# Patient Record
Sex: MALE | Race: WHITE | Employment: OTHER | URBAN - METROPOLITAN AREA
[De-identification: names, ages, dates, MRNs, and addresses within clinical notes are randomized per-mention and may not be internally consistent; named-entity substitution may affect disease eponyms.]

---

## 2017-01-01 ENCOUNTER — HOSPITAL ENCOUNTER (EMERGENCY)
Age: 82
Discharge: HOME | End: 2017-03-07
Attending: EMERGENCY MEDICINE
Payer: MEDICARE

## 2017-01-01 ENCOUNTER — HOME CARE VISIT (OUTPATIENT)
Dept: SCHEDULING | Facility: HOME HEALTH | Age: 82
End: 2017-01-01
Payer: MEDICARE

## 2017-01-01 ENCOUNTER — HOME CARE VISIT (OUTPATIENT)
Dept: HOSPICE | Facility: HOSPICE | Age: 82
End: 2017-01-01
Payer: MEDICARE

## 2017-01-01 ENCOUNTER — HOME CARE VISIT (OUTPATIENT)
Dept: HOME HEALTH SERVICES | Facility: HOME HEALTH | Age: 82
End: 2017-01-01
Payer: MEDICARE

## 2017-01-01 ENCOUNTER — TELEPHONE (OUTPATIENT)
Dept: CARDIOLOGY CLINIC | Age: 82
End: 2017-01-01

## 2017-01-01 ENCOUNTER — OFFICE VISIT (OUTPATIENT)
Dept: CARDIOLOGY CLINIC | Age: 82
End: 2017-01-01

## 2017-01-01 ENCOUNTER — APPOINTMENT (OUTPATIENT)
Dept: NUCLEAR MEDICINE | Age: 82
End: 2017-01-01
Attending: EMERGENCY MEDICINE
Payer: MEDICARE

## 2017-01-01 ENCOUNTER — HOSPITAL ENCOUNTER (INPATIENT)
Age: 82
LOS: 2 days | Discharge: HOSPICE | DRG: 291 | End: 2017-03-16
Attending: EMERGENCY MEDICINE | Admitting: HOSPITALIST
Payer: MEDICARE

## 2017-01-01 ENCOUNTER — APPOINTMENT (OUTPATIENT)
Dept: GENERAL RADIOLOGY | Age: 82
DRG: 291 | End: 2017-01-01
Attending: EMERGENCY MEDICINE
Payer: MEDICARE

## 2017-01-01 ENCOUNTER — HOSPICE ADMISSION (OUTPATIENT)
Dept: HOSPICE | Facility: HOSPICE | Age: 82
End: 2017-01-01
Payer: MEDICARE

## 2017-01-01 ENCOUNTER — APPOINTMENT (OUTPATIENT)
Dept: GENERAL RADIOLOGY | Age: 82
End: 2017-01-01
Attending: EMERGENCY MEDICINE
Payer: MEDICARE

## 2017-01-01 ENCOUNTER — TELEPHONE (OUTPATIENT)
Dept: CARDIAC REHAB | Age: 82
End: 2017-01-01

## 2017-01-01 ENCOUNTER — HOSPITAL ENCOUNTER (OUTPATIENT)
Dept: LAB | Age: 82
Discharge: HOME | End: 2017-01-17

## 2017-01-01 ENCOUNTER — TELEPHONE (OUTPATIENT)
Dept: HOME HEALTH SERVICES | Facility: HOME HEALTH | Age: 82
End: 2017-01-01

## 2017-01-01 VITALS — SYSTOLIC BLOOD PRESSURE: 122 MMHG | DIASTOLIC BLOOD PRESSURE: 59 MMHG | HEART RATE: 70 BPM | OXYGEN SATURATION: 96 %

## 2017-01-01 VITALS — OXYGEN SATURATION: 97 % | DIASTOLIC BLOOD PRESSURE: 53 MMHG | SYSTOLIC BLOOD PRESSURE: 119 MMHG | HEART RATE: 78 BPM

## 2017-01-01 VITALS — DIASTOLIC BLOOD PRESSURE: 55 MMHG | HEART RATE: 72 BPM | SYSTOLIC BLOOD PRESSURE: 129 MMHG | OXYGEN SATURATION: 99 %

## 2017-01-01 VITALS
DIASTOLIC BLOOD PRESSURE: 52 MMHG | HEART RATE: 70 BPM | WEIGHT: 142 LBS | OXYGEN SATURATION: 97 % | BODY MASS INDEX: 22.29 KG/M2 | HEIGHT: 67 IN | SYSTOLIC BLOOD PRESSURE: 118 MMHG

## 2017-01-01 VITALS
SYSTOLIC BLOOD PRESSURE: 113 MMHG | OXYGEN SATURATION: 92 % | HEART RATE: 69 BPM | RESPIRATION RATE: 16 BRPM | TEMPERATURE: 98.6 F | DIASTOLIC BLOOD PRESSURE: 64 MMHG

## 2017-01-01 VITALS
OXYGEN SATURATION: 97 % | DIASTOLIC BLOOD PRESSURE: 51 MMHG | WEIGHT: 136.4 LBS | SYSTOLIC BLOOD PRESSURE: 115 MMHG | BODY MASS INDEX: 21.41 KG/M2 | TEMPERATURE: 97.4 F | RESPIRATION RATE: 18 BRPM | HEART RATE: 69 BPM | HEIGHT: 67 IN

## 2017-01-01 VITALS
DIASTOLIC BLOOD PRESSURE: 64 MMHG | BODY MASS INDEX: 20.28 KG/M2 | RESPIRATION RATE: 16 BRPM | TEMPERATURE: 95.6 F | OXYGEN SATURATION: 97 % | HEIGHT: 67 IN | WEIGHT: 129.19 LBS | SYSTOLIC BLOOD PRESSURE: 100 MMHG | HEART RATE: 70 BPM

## 2017-01-01 VITALS
TEMPERATURE: 96.6 F | RESPIRATION RATE: 20 BRPM | SYSTOLIC BLOOD PRESSURE: 100 MMHG | HEART RATE: 76 BPM | OXYGEN SATURATION: 88 % | DIASTOLIC BLOOD PRESSURE: 60 MMHG

## 2017-01-01 VITALS
DIASTOLIC BLOOD PRESSURE: 63 MMHG | RESPIRATION RATE: 18 BRPM | TEMPERATURE: 97 F | OXYGEN SATURATION: 83 % | SYSTOLIC BLOOD PRESSURE: 99 MMHG | HEART RATE: 63 BPM

## 2017-01-01 VITALS
OXYGEN SATURATION: 98 % | RESPIRATION RATE: 16 BRPM | SYSTOLIC BLOOD PRESSURE: 110 MMHG | HEART RATE: 78 BPM | TEMPERATURE: 97 F | DIASTOLIC BLOOD PRESSURE: 80 MMHG

## 2017-01-01 VITALS
HEART RATE: 70 BPM | RESPIRATION RATE: 18 BRPM | SYSTOLIC BLOOD PRESSURE: 98 MMHG | TEMPERATURE: 96.9 F | DIASTOLIC BLOOD PRESSURE: 64 MMHG | OXYGEN SATURATION: 79 %

## 2017-01-01 VITALS
TEMPERATURE: 97 F | SYSTOLIC BLOOD PRESSURE: 122 MMHG | DIASTOLIC BLOOD PRESSURE: 72 MMHG | OXYGEN SATURATION: 86 % | RESPIRATION RATE: 16 BRPM | HEART RATE: 52 BPM

## 2017-01-01 VITALS
TEMPERATURE: 98 F | OXYGEN SATURATION: 97 % | SYSTOLIC BLOOD PRESSURE: 110 MMHG | DIASTOLIC BLOOD PRESSURE: 70 MMHG | RESPIRATION RATE: 16 BRPM | HEART RATE: 72 BPM

## 2017-01-01 VITALS
RESPIRATION RATE: 20 BRPM | TEMPERATURE: 98.2 F | OXYGEN SATURATION: 93 % | SYSTOLIC BLOOD PRESSURE: 110 MMHG | HEART RATE: 72 BPM | DIASTOLIC BLOOD PRESSURE: 60 MMHG

## 2017-01-01 VITALS — SYSTOLIC BLOOD PRESSURE: 133 MMHG | DIASTOLIC BLOOD PRESSURE: 54 MMHG | OXYGEN SATURATION: 92 % | HEART RATE: 72 BPM

## 2017-01-01 VITALS
TEMPERATURE: 98.6 F | HEART RATE: 70 BPM | SYSTOLIC BLOOD PRESSURE: 123 MMHG | RESPIRATION RATE: 18 BRPM | OXYGEN SATURATION: 96 % | DIASTOLIC BLOOD PRESSURE: 72 MMHG

## 2017-01-01 VITALS
DIASTOLIC BLOOD PRESSURE: 57 MMHG | HEART RATE: 70 BPM | OXYGEN SATURATION: 94 % | TEMPERATURE: 96 F | RESPIRATION RATE: 20 BRPM | SYSTOLIC BLOOD PRESSURE: 109 MMHG

## 2017-01-01 VITALS — HEART RATE: 75 BPM | DIASTOLIC BLOOD PRESSURE: 71 MMHG | SYSTOLIC BLOOD PRESSURE: 123 MMHG | OXYGEN SATURATION: 98 %

## 2017-01-01 VITALS
TEMPERATURE: 96.6 F | RESPIRATION RATE: 20 BRPM | HEART RATE: 60 BPM | DIASTOLIC BLOOD PRESSURE: 60 MMHG | SYSTOLIC BLOOD PRESSURE: 96 MMHG | OXYGEN SATURATION: 88 %

## 2017-01-01 VITALS
HEART RATE: 72 BPM | OXYGEN SATURATION: 93 % | SYSTOLIC BLOOD PRESSURE: 102 MMHG | RESPIRATION RATE: 20 BRPM | TEMPERATURE: 97.6 F | DIASTOLIC BLOOD PRESSURE: 56 MMHG

## 2017-01-01 VITALS
HEIGHT: 67 IN | SYSTOLIC BLOOD PRESSURE: 118 MMHG | HEART RATE: 71 BPM | OXYGEN SATURATION: 92 % | WEIGHT: 136.8 LBS | BODY MASS INDEX: 21.47 KG/M2 | DIASTOLIC BLOOD PRESSURE: 58 MMHG

## 2017-01-01 VITALS — TEMPERATURE: 97.3 F | HEART RATE: 80 BPM | DIASTOLIC BLOOD PRESSURE: 50 MMHG | SYSTOLIC BLOOD PRESSURE: 98 MMHG

## 2017-01-01 VITALS
OXYGEN SATURATION: 97 % | WEIGHT: 134 LBS | BODY MASS INDEX: 21.03 KG/M2 | HEART RATE: 70 BPM | HEIGHT: 67 IN | DIASTOLIC BLOOD PRESSURE: 50 MMHG | SYSTOLIC BLOOD PRESSURE: 118 MMHG

## 2017-01-01 VITALS — SYSTOLIC BLOOD PRESSURE: 124 MMHG | DIASTOLIC BLOOD PRESSURE: 81 MMHG | OXYGEN SATURATION: 94 % | HEART RATE: 71 BPM

## 2017-01-01 VITALS
HEART RATE: 72 BPM | TEMPERATURE: 97.6 F | OXYGEN SATURATION: 99 % | RESPIRATION RATE: 16 BRPM | SYSTOLIC BLOOD PRESSURE: 118 MMHG | DIASTOLIC BLOOD PRESSURE: 68 MMHG

## 2017-01-01 VITALS
HEART RATE: 71 BPM | OXYGEN SATURATION: 98 % | DIASTOLIC BLOOD PRESSURE: 47 MMHG | SYSTOLIC BLOOD PRESSURE: 101 MMHG | WEIGHT: 129.6 LBS | BODY MASS INDEX: 20.3 KG/M2

## 2017-01-01 VITALS
HEART RATE: 72 BPM | TEMPERATURE: 97.2 F | SYSTOLIC BLOOD PRESSURE: 102 MMHG | OXYGEN SATURATION: 91 % | DIASTOLIC BLOOD PRESSURE: 64 MMHG | RESPIRATION RATE: 20 BRPM

## 2017-01-01 VITALS — OXYGEN SATURATION: 98 % | HEART RATE: 55 BPM | SYSTOLIC BLOOD PRESSURE: 102 MMHG | DIASTOLIC BLOOD PRESSURE: 45 MMHG

## 2017-01-01 VITALS
RESPIRATION RATE: 20 BRPM | DIASTOLIC BLOOD PRESSURE: 60 MMHG | SYSTOLIC BLOOD PRESSURE: 104 MMHG | TEMPERATURE: 97 F | OXYGEN SATURATION: 96 % | HEART RATE: 70 BPM

## 2017-01-01 VITALS
SYSTOLIC BLOOD PRESSURE: 110 MMHG | DIASTOLIC BLOOD PRESSURE: 64 MMHG | HEART RATE: 68 BPM | RESPIRATION RATE: 20 BRPM | TEMPERATURE: 97.4 F | OXYGEN SATURATION: 93 %

## 2017-01-01 VITALS — HEART RATE: 83 BPM | DIASTOLIC BLOOD PRESSURE: 64 MMHG | OXYGEN SATURATION: 96 % | SYSTOLIC BLOOD PRESSURE: 98 MMHG

## 2017-01-01 DIAGNOSIS — J18.9 PNEUMONIA OF LEFT LUNG DUE TO INFECTIOUS ORGANISM, UNSPECIFIED PART OF LUNG: ICD-10-CM

## 2017-01-01 DIAGNOSIS — Z86.79 H/O VALVULAR HEART DISEASE: ICD-10-CM

## 2017-01-01 DIAGNOSIS — I25.10 CORONARY ARTERY DISEASE INVOLVING NATIVE CORONARY ARTERY OF NATIVE HEART WITHOUT ANGINA PECTORIS: ICD-10-CM

## 2017-01-01 DIAGNOSIS — I25.5 ISCHEMIC CARDIOMYOPATHY: ICD-10-CM

## 2017-01-01 DIAGNOSIS — R06.00 DYSPNEA, UNSPECIFIED TYPE: Primary | ICD-10-CM

## 2017-01-01 DIAGNOSIS — I50.22 CHRONIC SYSTOLIC CONGESTIVE HEART FAILURE (HCC): Primary | ICD-10-CM

## 2017-01-01 DIAGNOSIS — I48.91 ATRIAL FIBRILLATION, UNSPECIFIED TYPE (HCC): ICD-10-CM

## 2017-01-01 DIAGNOSIS — I50.20 SYSTOLIC CONGESTIVE HEART FAILURE, UNSPECIFIED CONGESTIVE HEART FAILURE CHRONICITY: ICD-10-CM

## 2017-01-01 DIAGNOSIS — I50.9 ACUTE ON CHRONIC CONGESTIVE HEART FAILURE, UNSPECIFIED CONGESTIVE HEART FAILURE TYPE: Primary | ICD-10-CM

## 2017-01-01 DIAGNOSIS — I25.5 ISCHEMIC CARDIOMYOPATHY: Primary | ICD-10-CM

## 2017-01-01 DIAGNOSIS — R53.83 FATIGUE, UNSPECIFIED TYPE: ICD-10-CM

## 2017-01-01 DIAGNOSIS — I25.10 CORONARY ARTERY DISEASE INVOLVING NATIVE CORONARY ARTERY OF NATIVE HEART WITHOUT ANGINA PECTORIS: Primary | ICD-10-CM

## 2017-01-01 DIAGNOSIS — I10 ESSENTIAL HYPERTENSION, BENIGN: ICD-10-CM

## 2017-01-01 DIAGNOSIS — R06.02 SOB (SHORTNESS OF BREATH): Primary | ICD-10-CM

## 2017-01-01 DIAGNOSIS — Z95.810 S/P IMPLANTATION OF AUTOMATIC CARDIOVERTER/DEFIBRILLATOR (AICD): ICD-10-CM

## 2017-01-01 DIAGNOSIS — I50.9 ACUTE ON CHRONIC CONGESTIVE HEART FAILURE, UNSPECIFIED CONGESTIVE HEART FAILURE TYPE: ICD-10-CM

## 2017-01-01 DIAGNOSIS — E78.5 DYSLIPIDEMIA: ICD-10-CM

## 2017-01-01 LAB
ALBUMIN SERPL BCP-MCNC: 3.7 G/DL (ref 3.4–5)
ALBUMIN SERPL BCP-MCNC: 4 G/DL (ref 3.4–5)
ALBUMIN/GLOB SERPL: 1.3 {RATIO} (ref 0.8–1.7)
ALBUMIN/GLOB SERPL: 1.4 {RATIO} (ref 0.8–1.7)
ALP SERPL-CCNC: 104 U/L (ref 45–117)
ALP SERPL-CCNC: 139 U/L (ref 45–117)
ALT SERPL-CCNC: 123 U/L (ref 16–61)
ALT SERPL-CCNC: 30 U/L (ref 16–61)
ANION GAP BLD CALC-SCNC: 10 MMOL/L (ref 3–18)
ANION GAP BLD CALC-SCNC: 11 MMOL/L (ref 3–18)
ANION GAP BLD CALC-SCNC: 12 MMOL/L (ref 3–18)
APPEARANCE UR: CLEAR
AST SERPL W P-5'-P-CCNC: 64 U/L (ref 15–37)
AST SERPL W P-5'-P-CCNC: 87 U/L (ref 15–37)
ATRIAL RATE: 53 BPM
ATRIAL RATE: 62 BPM
BASOPHILS # BLD AUTO: 0 K/UL (ref 0–0.06)
BASOPHILS # BLD AUTO: 0 K/UL (ref 0–0.06)
BASOPHILS # BLD AUTO: 0 K/UL (ref 0–0.1)
BASOPHILS # BLD: 0 % (ref 0–2)
BASOPHILS # BLD: 0 % (ref 0–2)
BASOPHILS # BLD: 1 % (ref 0–2)
BILIRUB DIRECT SERPL-MCNC: 0.3 MG/DL (ref 0–0.2)
BILIRUB SERPL-MCNC: 1.8 MG/DL (ref 0.2–1)
BILIRUB SERPL-MCNC: 2.1 MG/DL (ref 0.2–1)
BILIRUB UR QL: NEGATIVE
BNP SERPL-MCNC: 3650 PG/ML (ref 0–1800)
BNP SERPL-MCNC: 6176 PG/ML (ref 0–1800)
BUN SERPL-MCNC: 40 MG/DL (ref 8–27)
BUN SERPL-MCNC: 41 MG/DL (ref 7–18)
BUN SERPL-MCNC: 52 MG/DL (ref 7–18)
BUN SERPL-MCNC: 61 MG/DL (ref 7–18)
BUN/CREAT SERPL: 23 (ref 12–20)
BUN/CREAT SERPL: 27 (ref 12–20)
BUN/CREAT SERPL: 27 (ref 12–20)
BUN/CREAT SERPL: 31 (ref 10–22)
CALCIUM SERPL-MCNC: 8.7 MG/DL (ref 8.5–10.1)
CALCIUM SERPL-MCNC: 8.8 MG/DL (ref 8.5–10.1)
CALCIUM SERPL-MCNC: 9.2 MG/DL (ref 8.5–10.1)
CALCIUM SERPL-MCNC: 9.4 MG/DL (ref 8.6–10.2)
CALCULATED R AXIS, ECG10: -94 DEGREES
CALCULATED R AXIS, ECG10: -97 DEGREES
CALCULATED T AXIS, ECG11: 89 DEGREES
CALCULATED T AXIS, ECG11: 91 DEGREES
CHLORIDE SERPL-SCNC: 100 MMOL/L (ref 100–108)
CHLORIDE SERPL-SCNC: 100 MMOL/L (ref 100–108)
CHLORIDE SERPL-SCNC: 93 MMOL/L (ref 96–106)
CHLORIDE SERPL-SCNC: 99 MMOL/L (ref 100–108)
CK MB CFR SERPL CALC: 1.3 % (ref 0–4)
CK MB CFR SERPL CALC: 3.2 % (ref 0–4)
CK MB SERPL-MCNC: 1.8 NG/ML (ref 5–25)
CK MB SERPL-MCNC: 1.9 NG/ML (ref 5–25)
CK SERPL-CCNC: 137 U/L (ref 39–308)
CK SERPL-CCNC: 59 U/L (ref 39–308)
CO2 SERPL-SCNC: 22 MMOL/L (ref 18–29)
CO2 SERPL-SCNC: 23 MMOL/L (ref 21–32)
CO2 SERPL-SCNC: 27 MMOL/L (ref 21–32)
CO2 SERPL-SCNC: 28 MMOL/L (ref 21–32)
COLOR UR: YELLOW
CREAT SERPL-MCNC: 1.27 MG/DL (ref 0.76–1.27)
CREAT SERPL-MCNC: 1.76 MG/DL (ref 0.6–1.3)
CREAT SERPL-MCNC: 1.9 MG/DL (ref 0.6–1.3)
CREAT SERPL-MCNC: 2.3 MG/DL (ref 0.6–1.3)
D DIMER PPP FEU-MCNC: 1.1 UG/ML(FEU)
DIAGNOSIS, 93000: NORMAL
DIAGNOSIS, 93000: NORMAL
DIFFERENTIAL METHOD BLD: ABNORMAL
DIGOXIN SERPL-MCNC: 1.5 NG/ML (ref 0.9–2)
DIGOXIN SERPL-MCNC: 2.8 NG/ML (ref 0.9–2)
EOSINOPHIL # BLD: 0 K/UL (ref 0–0.4)
EOSINOPHIL NFR BLD: 0 % (ref 0–5)
EOSINOPHIL NFR BLD: 1 % (ref 0–5)
EOSINOPHIL NFR BLD: 1 % (ref 0–5)
ERYTHROCYTE [DISTWIDTH] IN BLOOD BY AUTOMATED COUNT: 17.7 % (ref 11.6–14.5)
ERYTHROCYTE [DISTWIDTH] IN BLOOD BY AUTOMATED COUNT: 18 % (ref 11.6–14.5)
ERYTHROCYTE [DISTWIDTH] IN BLOOD BY AUTOMATED COUNT: 18.8 % (ref 11.6–14.5)
GLOBULIN SER CALC-MCNC: 2.7 G/DL (ref 2–4)
GLOBULIN SER CALC-MCNC: 3.1 G/DL (ref 2–4)
GLUCOSE SERPL-MCNC: 143 MG/DL (ref 74–99)
GLUCOSE SERPL-MCNC: 149 MG/DL (ref 74–99)
GLUCOSE SERPL-MCNC: 81 MG/DL (ref 74–99)
GLUCOSE SERPL-MCNC: 96 MG/DL (ref 65–99)
GLUCOSE UR STRIP.AUTO-MCNC: NEGATIVE MG/DL
HCT VFR BLD AUTO: 40.6 % (ref 36–48)
HCT VFR BLD AUTO: 42.2 % (ref 36–48)
HCT VFR BLD AUTO: 43.9 % (ref 36–48)
HGB BLD-MCNC: 13.3 G/DL (ref 13–16)
HGB BLD-MCNC: 14 G/DL (ref 13–16)
HGB BLD-MCNC: 14.3 G/DL (ref 13–16)
HGB UR QL STRIP: NEGATIVE
KETONES UR QL STRIP.AUTO: NEGATIVE MG/DL
LEUKOCYTE ESTERASE UR QL STRIP.AUTO: NEGATIVE
LYMPHOCYTES # BLD AUTO: 10 % (ref 21–52)
LYMPHOCYTES # BLD AUTO: 13 % (ref 21–52)
LYMPHOCYTES # BLD AUTO: 14 % (ref 21–52)
LYMPHOCYTES # BLD: 0.7 K/UL (ref 0.9–3.6)
LYMPHOCYTES # BLD: 0.8 K/UL (ref 0.9–3.6)
LYMPHOCYTES # BLD: 0.9 K/UL (ref 0.9–3.6)
MCH RBC QN AUTO: 35.8 PG (ref 24–34)
MCH RBC QN AUTO: 35.9 PG (ref 24–34)
MCH RBC QN AUTO: 36.1 PG (ref 24–34)
MCHC RBC AUTO-ENTMCNC: 32.6 G/DL (ref 31–37)
MCHC RBC AUTO-ENTMCNC: 32.8 G/DL (ref 31–37)
MCHC RBC AUTO-ENTMCNC: 33.2 G/DL (ref 31–37)
MCV RBC AUTO: 108.8 FL (ref 74–97)
MCV RBC AUTO: 109.1 FL (ref 74–97)
MCV RBC AUTO: 110.3 FL (ref 74–97)
MONOCYTES # BLD: 1.1 K/UL (ref 0.05–1.2)
MONOCYTES # BLD: 1.3 K/UL (ref 0.05–1.2)
MONOCYTES # BLD: 1.3 K/UL (ref 0.05–1.2)
MONOCYTES NFR BLD AUTO: 15 % (ref 3–10)
MONOCYTES NFR BLD AUTO: 19 % (ref 3–10)
MONOCYTES NFR BLD AUTO: 22 % (ref 3–10)
NEUTS SEG # BLD: 3.7 K/UL (ref 1.8–8)
NEUTS SEG # BLD: 5.1 K/UL (ref 1.8–8)
NEUTS SEG # BLD: 5.1 K/UL (ref 1.8–8)
NEUTS SEG NFR BLD AUTO: 63 % (ref 40–73)
NEUTS SEG NFR BLD AUTO: 70 % (ref 40–73)
NEUTS SEG NFR BLD AUTO: 71 % (ref 40–73)
NITRITE UR QL STRIP.AUTO: NEGATIVE
PH UR STRIP: 7 [PH] (ref 5–8)
PLATELET # BLD AUTO: 101 K/UL (ref 135–420)
PLATELET # BLD AUTO: 116 K/UL (ref 135–420)
PLATELET # BLD AUTO: 92 K/UL (ref 135–420)
PMV BLD AUTO: 10.8 FL (ref 9.2–11.8)
PMV BLD AUTO: 11.1 FL (ref 9.2–11.8)
PMV BLD AUTO: 11.2 FL (ref 9.2–11.8)
POTASSIUM SERPL-SCNC: 3.5 MMOL/L (ref 3.5–5.5)
POTASSIUM SERPL-SCNC: 4.8 MMOL/L (ref 3.5–5.5)
POTASSIUM SERPL-SCNC: 5.4 MMOL/L (ref 3.5–5.5)
POTASSIUM SERPL-SCNC: 6.2 MMOL/L (ref 3.5–5.2)
PROT SERPL-MCNC: 6.4 G/DL (ref 6.4–8.2)
PROT SERPL-MCNC: 7.1 G/DL (ref 6.4–8.2)
PROT UR STRIP-MCNC: NEGATIVE MG/DL
Q-T INTERVAL, ECG07: 436 MS
Q-T INTERVAL, ECG07: 438 MS
QRS DURATION, ECG06: 182 MS
QRS DURATION, ECG06: 190 MS
QTC CALCULATION (BEZET), ECG08: 470 MS
QTC CALCULATION (BEZET), ECG08: 473 MS
RBC # BLD AUTO: 3.72 M/UL (ref 4.7–5.5)
RBC # BLD AUTO: 3.88 M/UL (ref 4.7–5.5)
RBC # BLD AUTO: 3.98 M/UL (ref 4.7–5.5)
SODIUM SERPL-SCNC: 133 MMOL/L (ref 134–144)
SODIUM SERPL-SCNC: 135 MMOL/L (ref 136–145)
SODIUM SERPL-SCNC: 137 MMOL/L (ref 136–145)
SODIUM SERPL-SCNC: 138 MMOL/L (ref 136–145)
SP GR UR REFRACTOMETRY: 1.01 (ref 1–1.03)
TROPONIN I SERPL-MCNC: 0.05 NG/ML (ref 0–0.06)
TROPONIN I SERPL-MCNC: 0.06 NG/ML (ref 0–0.06)
TSH SERPL DL<=0.005 MIU/L-ACNC: 12.77 UIU/ML (ref 0.45–4.5)
UROBILINOGEN UR QL STRIP.AUTO: 1 EU/DL (ref 0.2–1)
VENTRICULAR RATE, ECG03: 70 BPM
VENTRICULAR RATE, ECG03: 70 BPM
WBC # BLD AUTO: 5.8 K/UL (ref 4.6–13.2)
WBC # BLD AUTO: 7.1 K/UL (ref 4.6–13.2)
WBC # BLD AUTO: 7.2 K/UL (ref 4.6–13.2)

## 2017-01-01 PROCEDURE — G0156 HHCP-SVS OF AIDE,EA 15 MIN: HCPCS

## 2017-01-01 PROCEDURE — 83880 ASSAY OF NATRIURETIC PEPTIDE: CPT | Performed by: EMERGENCY MEDICINE

## 2017-01-01 PROCEDURE — HOSPICE MEDICATION HC HH HOSPICE MEDICATION

## 2017-01-01 PROCEDURE — 74011250637 HC RX REV CODE- 250/637: Performed by: HOSPITALIST

## 2017-01-01 PROCEDURE — T4527 ADULT SIZE PULL-ON LG: HCPCS

## 2017-01-01 PROCEDURE — 0651 HSPC ROUTINE HOME CARE

## 2017-01-01 PROCEDURE — T4541 LARGE DISPOSABLE UNDERPAD: HCPCS

## 2017-01-01 PROCEDURE — 80048 BASIC METABOLIC PNL TOTAL CA: CPT | Performed by: EMERGENCY MEDICINE

## 2017-01-01 PROCEDURE — G0299 HHS/HOSPICE OF RN EA 15 MIN: HCPCS

## 2017-01-01 PROCEDURE — A4927 NON-STERILE GLOVES: HCPCS

## 2017-01-01 PROCEDURE — G0300 HHS/HOSPICE OF LPN EA 15 MIN: HCPCS

## 2017-01-01 PROCEDURE — 80048 BASIC METABOLIC PNL TOTAL CA: CPT | Performed by: HOSPITALIST

## 2017-01-01 PROCEDURE — G0155 HHCP-SVS OF CSW,EA 15 MIN: HCPCS

## 2017-01-01 PROCEDURE — 82550 ASSAY OF CK (CPK): CPT | Performed by: EMERGENCY MEDICINE

## 2017-01-01 PROCEDURE — 93005 ELECTROCARDIOGRAM TRACING: CPT

## 2017-01-01 PROCEDURE — G0152 HHCP-SERV OF OT,EA 15 MIN: HCPCS

## 2017-01-01 PROCEDURE — 71020 XR CHEST PA LAT: CPT

## 2017-01-01 PROCEDURE — 94762 N-INVAS EAR/PLS OXIMTRY CONT: CPT

## 2017-01-01 PROCEDURE — G0157 HHC PT ASSISTANT EA 15: HCPCS

## 2017-01-01 PROCEDURE — 74011000258 HC RX REV CODE- 258: Performed by: EMERGENCY MEDICINE

## 2017-01-01 PROCEDURE — 99001 SPECIMEN HANDLING PT-LAB: CPT | Performed by: NURSE PRACTITIONER

## 2017-01-01 PROCEDURE — 80162 ASSAY OF DIGOXIN TOTAL: CPT | Performed by: EMERGENCY MEDICINE

## 2017-01-01 PROCEDURE — A9540 TC99M MAA: HCPCS

## 2017-01-01 PROCEDURE — 65270000029 HC RM PRIVATE

## 2017-01-01 PROCEDURE — 74011250636 HC RX REV CODE- 250/636: Performed by: HOSPITALIST

## 2017-01-01 PROCEDURE — 74011250636 HC RX REV CODE- 250/636: Performed by: EMERGENCY MEDICINE

## 2017-01-01 PROCEDURE — 96365 THER/PROPH/DIAG IV INF INIT: CPT

## 2017-01-01 PROCEDURE — 99285 EMERGENCY DEPT VISIT HI MDM: CPT

## 2017-01-01 PROCEDURE — G0151 HHCP-SERV OF PT,EA 15 MIN: HCPCS

## 2017-01-01 PROCEDURE — 71010 XR CHEST PORT: CPT

## 2017-01-01 PROCEDURE — 96375 TX/PRO/DX INJ NEW DRUG ADDON: CPT

## 2017-01-01 PROCEDURE — 81003 URINALYSIS AUTO W/O SCOPE: CPT | Performed by: EMERGENCY MEDICINE

## 2017-01-01 PROCEDURE — 80076 HEPATIC FUNCTION PANEL: CPT | Performed by: EMERGENCY MEDICINE

## 2017-01-01 PROCEDURE — 80053 COMPREHEN METABOLIC PANEL: CPT | Performed by: EMERGENCY MEDICINE

## 2017-01-01 PROCEDURE — 36415 COLL VENOUS BLD VENIPUNCTURE: CPT | Performed by: HOSPITALIST

## 2017-01-01 PROCEDURE — 85025 COMPLETE CBC W/AUTO DIFF WBC: CPT | Performed by: EMERGENCY MEDICINE

## 2017-01-01 PROCEDURE — 3336500001 HSPC ELECTION

## 2017-01-01 PROCEDURE — T4543 ADULT DISP BRIEF/DIAP ABV XL: HCPCS

## 2017-01-01 PROCEDURE — 77010033678 HC OXYGEN DAILY

## 2017-01-01 PROCEDURE — T4528 ADULT SIZE PULL-ON XL: HCPCS

## 2017-01-01 PROCEDURE — 85379 FIBRIN DEGRADATION QUANT: CPT | Performed by: EMERGENCY MEDICINE

## 2017-01-01 PROCEDURE — 80162 ASSAY OF DIGOXIN TOTAL: CPT | Performed by: HOSPITALIST

## 2017-01-01 PROCEDURE — 85025 COMPLETE CBC W/AUTO DIFF WBC: CPT | Performed by: HOSPITALIST

## 2017-01-01 RX ORDER — CARVEDILOL 3.12 MG/1
3.12 TABLET ORAL 2 TIMES DAILY WITH MEALS
Status: DISCONTINUED | OUTPATIENT
Start: 2017-01-01 | End: 2017-01-01 | Stop reason: HOSPADM

## 2017-01-01 RX ORDER — CLOPIDOGREL BISULFATE 75 MG/1
75 TABLET ORAL DAILY
Status: DISCONTINUED | OUTPATIENT
Start: 2017-01-01 | End: 2017-01-01 | Stop reason: HOSPADM

## 2017-01-01 RX ORDER — SIMVASTATIN 40 MG/1
40 TABLET, FILM COATED ORAL
Status: DISCONTINUED | OUTPATIENT
Start: 2017-01-01 | End: 2017-01-01 | Stop reason: HOSPADM

## 2017-01-01 RX ORDER — POTASSIUM CHLORIDE 1500 MG/1
20 TABLET, FILM COATED, EXTENDED RELEASE ORAL DAILY
Status: ON HOLD | COMMUNITY
End: 2017-01-01

## 2017-01-01 RX ORDER — LORAZEPAM 0.5 MG/1
0.5 TABLET ORAL
Status: DISCONTINUED | OUTPATIENT
Start: 2017-01-01 | End: 2017-01-01 | Stop reason: HOSPADM

## 2017-01-01 RX ORDER — THERA TABS 400 MCG
1 TAB ORAL DAILY
Status: DISCONTINUED | OUTPATIENT
Start: 2017-01-01 | End: 2017-01-01 | Stop reason: HOSPADM

## 2017-01-01 RX ORDER — CEFTRIAXONE 1 G/1
1 INJECTION, POWDER, FOR SOLUTION INTRAMUSCULAR; INTRAVENOUS
Status: DISCONTINUED | OUTPATIENT
Start: 2017-01-01 | End: 2017-01-01

## 2017-01-01 RX ORDER — LOSARTAN POTASSIUM 25 MG/1
25 TABLET ORAL DAILY
Qty: 90 TAB | Refills: 3 | Status: SHIPPED | OUTPATIENT
Start: 2017-01-01 | End: 2017-01-01 | Stop reason: ALTCHOICE

## 2017-01-01 RX ORDER — DIGOXIN 125 MCG
0.12 TABLET ORAL DAILY
Status: DISCONTINUED | OUTPATIENT
Start: 2017-01-01 | End: 2017-01-01

## 2017-01-01 RX ORDER — HEPARIN SODIUM 5000 [USP'U]/ML
5000 INJECTION, SOLUTION INTRAVENOUS; SUBCUTANEOUS EVERY 8 HOURS
Status: DISCONTINUED | OUTPATIENT
Start: 2017-01-01 | End: 2017-01-01 | Stop reason: HOSPADM

## 2017-01-01 RX ORDER — BUMETANIDE 0.5 MG/1
1 TABLET ORAL 2 TIMES DAILY
Status: DISCONTINUED | OUTPATIENT
Start: 2017-01-01 | End: 2017-01-01 | Stop reason: HOSPADM

## 2017-01-01 RX ORDER — ASPIRIN 81 MG/1
81 TABLET ORAL DAILY
Status: DISCONTINUED | OUTPATIENT
Start: 2017-01-01 | End: 2017-01-01 | Stop reason: HOSPADM

## 2017-01-01 RX ORDER — ONDANSETRON 2 MG/ML
4 INJECTION INTRAMUSCULAR; INTRAVENOUS
Status: DISCONTINUED | OUTPATIENT
Start: 2017-01-01 | End: 2017-01-01 | Stop reason: HOSPADM

## 2017-01-01 RX ORDER — LOSARTAN POTASSIUM 25 MG/1
25 TABLET ORAL DAILY
Qty: 30 TAB | Refills: 4 | Status: SHIPPED | OUTPATIENT
Start: 2017-01-01 | End: 2017-01-01

## 2017-01-01 RX ORDER — CARVEDILOL 25 MG/1
TABLET ORAL
Qty: 180 TAB | Refills: 3 | Status: SHIPPED | OUTPATIENT
Start: 2017-01-01 | End: 2017-01-01

## 2017-01-01 RX ORDER — POLYVINYL ALCOHOL 14 MG/ML
1 SOLUTION/ DROPS OPHTHALMIC AS NEEDED
Status: DISCONTINUED | OUTPATIENT
Start: 2017-01-01 | End: 2017-01-01 | Stop reason: HOSPADM

## 2017-01-01 RX ORDER — LORAZEPAM 0.5 MG/1
0.5 TABLET ORAL
COMMUNITY

## 2017-01-01 RX ORDER — FUROSEMIDE 10 MG/ML
80 INJECTION INTRAMUSCULAR; INTRAVENOUS
Status: COMPLETED | OUTPATIENT
Start: 2017-01-01 | End: 2017-01-01

## 2017-01-01 RX ORDER — ACETAMINOPHEN 500 MG
500 TABLET ORAL
Status: DISCONTINUED | OUTPATIENT
Start: 2017-01-01 | End: 2017-01-01 | Stop reason: HOSPADM

## 2017-01-01 RX ORDER — LOSARTAN POTASSIUM 50 MG/1
25 TABLET ORAL DAILY
Status: DISCONTINUED | OUTPATIENT
Start: 2017-01-01 | End: 2017-01-01

## 2017-01-01 RX ORDER — DIGOXIN 125 MCG
TABLET ORAL
Qty: 90 TAB | Refills: 3 | Status: CANCELLED | OUTPATIENT
Start: 2017-01-01

## 2017-01-01 RX ORDER — POLYVINYL ALCOHOL 14 MG/ML
1 SOLUTION/ DROPS OPHTHALMIC AS NEEDED
Qty: 15 ML | Refills: 0 | Status: SHIPPED | OUTPATIENT
Start: 2017-01-01 | End: 2017-01-01

## 2017-01-01 RX ORDER — LOSARTAN POTASSIUM 25 MG/1
25 TABLET ORAL DAILY
Status: ON HOLD | COMMUNITY
End: 2017-01-01

## 2017-01-01 RX ORDER — LEVOTHYROXINE SODIUM 25 UG/1
50 TABLET ORAL
Status: DISCONTINUED | OUTPATIENT
Start: 2017-01-01 | End: 2017-01-01 | Stop reason: HOSPADM

## 2017-01-01 RX ORDER — CARVEDILOL 3.12 MG/1
3.12 TABLET ORAL 2 TIMES DAILY WITH MEALS
Qty: 60 TAB | Refills: 0 | Status: SHIPPED | OUTPATIENT
Start: 2017-01-01

## 2017-01-01 RX ADMIN — LEVOTHYROXINE SODIUM 50 MCG: 25 TABLET ORAL at 09:28

## 2017-01-01 RX ADMIN — CEFTRIAXONE 1 G: 1 INJECTION, POWDER, FOR SOLUTION INTRAMUSCULAR; INTRAVENOUS at 11:31

## 2017-01-01 RX ADMIN — SIMVASTATIN 40 MG: 40 TABLET, FILM COATED ORAL at 21:33

## 2017-01-01 RX ADMIN — CLOPIDOGREL BISULFATE 75 MG: 75 TABLET ORAL at 09:28

## 2017-01-01 RX ADMIN — LEVOTHYROXINE SODIUM 50 MCG: 25 TABLET ORAL at 06:49

## 2017-01-01 RX ADMIN — BUMETANIDE 1 MG: 0.5 TABLET ORAL at 21:33

## 2017-01-01 RX ADMIN — THERA TABS 1 TABLET: TAB at 09:28

## 2017-01-01 RX ADMIN — HEPARIN SODIUM 5000 UNITS: 5000 INJECTION, SOLUTION INTRAVENOUS; SUBCUTANEOUS at 21:35

## 2017-01-01 RX ADMIN — BUMETANIDE 1 MG: 0.5 TABLET ORAL at 18:00

## 2017-01-01 RX ADMIN — CARVEDILOL 3.12 MG: 3.12 TABLET, FILM COATED ORAL at 17:00

## 2017-01-01 RX ADMIN — CARVEDILOL 3.12 MG: 3.12 TABLET, FILM COATED ORAL at 21:33

## 2017-01-01 RX ADMIN — SODIUM CHLORIDE 250 ML: 900 INJECTION, SOLUTION INTRAVENOUS at 14:00

## 2017-01-01 RX ADMIN — SIMVASTATIN 40 MG: 40 TABLET, FILM COATED ORAL at 21:06

## 2017-01-01 RX ADMIN — NAPHAZOLINE HYDROCHLORIDE AND PHENIRAMINE MALEATE 2 DROP: .25; 3 SOLUTION/ DROPS OPHTHALMIC at 06:52

## 2017-01-01 RX ADMIN — ASPIRIN 81 MG: 81 TABLET, COATED ORAL at 08:25

## 2017-01-01 RX ADMIN — LORAZEPAM 0.5 MG: 0.5 TABLET ORAL at 21:33

## 2017-01-01 RX ADMIN — LORAZEPAM 0.5 MG: 0.5 TABLET ORAL at 21:06

## 2017-01-01 RX ADMIN — CARVEDILOL 3.12 MG: 3.12 TABLET, FILM COATED ORAL at 09:28

## 2017-01-01 RX ADMIN — ASPIRIN 81 MG: 81 TABLET, COATED ORAL at 09:27

## 2017-01-01 RX ADMIN — THERA TABS 1 TABLET: TAB at 08:24

## 2017-01-01 RX ADMIN — FUROSEMIDE 80 MG: 10 INJECTION, SOLUTION INTRAVENOUS at 10:56

## 2017-01-01 RX ADMIN — CLOPIDOGREL BISULFATE 75 MG: 75 TABLET ORAL at 08:24

## 2017-01-01 RX ADMIN — BUMETANIDE 1 MG: 0.5 TABLET ORAL at 09:29

## 2017-01-17 NOTE — PATIENT INSTRUCTIONS
TSH and BMP today  Hold lisinopril for one week to see if cough resolves  If cough continues even while off of lisinopril, restart medication  Call the office to let us know  All other medications to remain the same  Follow-up with Dr. Idalia Rust as scheduled and as needed  Weigh daily and record  Limit sodium intake to 2000mg per day  Limit fluid intake to no more than  6, eight ounce glasses of any type of fluids per day (48 ounces per day)  Call if you notice sudden or progressive weight gain (3-5 pounds in 2-3 days), increasing shortness of breath, abdominal bloating, increasing lower extremity edema, inability to lie flat or on your normal number of pillows, having to sit up to catch your breath, fatigue, increased somnolence (sleeping more), poor appetite

## 2017-01-17 NOTE — PROGRESS NOTES
1. Have you been to the ER, urgent care clinic since your last visit? Hospitalized since your last visit? Yes When: [facility] 12/27/16    2. Have you seen or consulted any other health care providers outside of the [address] [place] [address] since your last visit? Include any pap smears or colon screening.  No

## 2017-01-17 NOTE — MR AVS SNAPSHOT
Visit Information Date & Time Provider Department Dept. Phone Encounter #  
 1/17/2017 10:00 AM Marylen Brunet, NP [facility] [id number] Your Appointments 3/28/2017  1:40 PM  
Follow Up with Vern Clemens MD  
[facility] ([facility]) Appt Note: 6 month follow up; 2-3 month f/up Chanel Marcus [phone] Nadia Schmitt [phone]  
  
    
 3/28/2017  1:40 PM  
PROCEDURE with Pacer Hv Csi [facility] ([facility]) Appt Note: 3 defib check-guidant Chanel Randhawacarlos Marcus [phone] [phone] [address] Upcoming Health Maintenance Date Due DTaP/Tdap/Td series (1 - Tdap) 2/18/1949 ZOSTER VACCINE AGE 60> 2/18/1988 GLAUCOMA SCREENING Q2Y 2/18/1993 Pneumococcal 65+ High/Highest Risk (1 of 2 - PCV13) 2/18/1993 MEDICARE YEARLY EXAM 2/18/1993 INFLUENZA AGE 9 TO ADULT 8/1/2016 Allergies as of 1/17/2017  Review Complete On: 1/17/2017 By: Marylen Brunet, NP No Known Allergies Current Immunizations  Never Reviewed No immunizations on file. Not reviewed this visit You Were Diagnosed With   
  
 Codes Comments Chronic systolic congestive heart failure (HCC)    -  Primary ICD-10-CM: V74.75 ICD-9-CM: 428.22, 428.0 Dyslipidemia     ICD-10-CM: E78.5 ICD-9-CM: 272.4 Coronary artery disease involving native coronary artery of native heart without angina pectoris     ICD-10-CM: I25.10 ICD-9-CM: 414.01 Atrial fibrillation, unspecified type ([facility] [address] [address]     ICD-10-CM: I48.91 
ICD-9-CM: 427.31 Vitals BP Pulse Height(growth percentile) Weight(growth percentile) SpO2 BMI  
 118/50 70 5' 7\" (1.702 m) 134 lb (60.8 kg) 97% 20.99 kg/m2 Smoking Status Never Smoker BMI and BSA Data Body Mass Index Body Surface Area  
 20.99 kg/m 2 1.7 m 2 Preferred Pharmacy Pharmacy Name Phone [facility] [address] [place] [address] [address] [phone] Your Updated Medication List  
  
   
This list is accurate as of: 1/17/17 10:35 AM.  Always use your most recent med list.  
  
  
  
  
 aspirin 81 mg tablet Take 81 mg by mouth daily. bacitracin ointment Commonly known as:  BACITRACIN Apply 500 Units to affected area two (2) times a day. bumetanide 1 mg tablet Commonly known as:  Dalton John Take 1 Tab by mouth two (2) times a day. carvedilol 25 mg tablet Commonly known as:  COREG  
TAKE 1 TABLET TWICE DAILY  
  
 clopidogrel 75 mg Tab Commonly known as:  PLAVIX TAKE 1 TABLET EVERY DAY  
  
 digoxin 0.125 mg tablet Commonly known as:  LANOXIN  
TAKE 1 TABLET EVERY DAY  
  
 levothyroxine 50 mcg tablet Commonly known as:  SYNTHROID Take 1 Tab by mouth Daily (before breakfast). lisinopril 2.5 mg tablet Commonly known as:  Vijay Bridges Take 1 Tab by mouth daily. LORazepam 0.5 mg tablet Commonly known as:  ATIVAN Take 1 Tab by mouth every eight (8) hours as needed. Max Daily Amount: 1.5 mg.  
  
 multivitamin tablet Commonly known as:  ONE A DAY Take 1 Tab by mouth daily. potassium chloride 20 mEq tablet Commonly known as:  K-DUR, KLOR-CON Take 1 Tab by mouth daily. OR AS DIRECTED  
  
 simvastatin 40 mg tablet Commonly known as:  ZOCOR Take 1 Tab by mouth nightly. spironolactone 25 mg tablet Commonly known as:  ALDACTONE Take 1 Tab by mouth daily. Kahlil Ours Wheeled walker To-Do List   
 01/17/2017 Lab:  METABOLIC PANEL, BASIC   
  
 01/17/2017 Lab:  TSH 3RD GENERATION   
  
 01/18/2017 To Be Determined   Appointment with Don Watson PTA at [address] [place] [address] CARE SCHEDULING/INTAKE  
  
 01/20/2017 To Be Determined Appointment with José Antonio Talbot PTA at [address] [facility]  
  
 01/23/2017 To Be Determined Appointment with José Antonio Talbot PTA at [address] [facility]  
  
 01/24/2017 To Be Determined Appointment with Ela Melendez RN at [address] [facility]  
  
 01/26/2017 To Be Determined Appointment with Cassie Mariscal at [address] Patient Instructions TSH and BMP today Hold lisinopril for one week to see if cough resolves If cough continues even while off of lisinopril, restart medication Call the office to let us know All other medications to remain the same Follow-up with Dr. Brook Leslie as scheduled and as needed Weigh daily and record Limit sodium intake to 2000mg per day Limit fluid intake to no more than  6, eight ounce glasses of any type of fluids per day (48 ounces per day) Call if you notice sudden or progressive weight gain (3-5 pounds in 2-3 days), increasing shortness of breath, abdominal bloating, increasing lower extremity edema, inability to lie flat or on your normal number of pillows, having to sit up to catch your breath, fatigue, increased somnolence (sleeping more), poor appetite Introducing [facility]! [employer] introduces [employer] patient portal. Now you can access parts of your medical record, email your doctor's office, and request medication refills online. 1. In your internet browser, go to [email or web address] 2. Click on the First Time User? Click Here link in the Sign In box. You will see the New Member Sign Up page. 3. Enter your [employer] Access Code exactly as it appears below. You will not need to use this code after youve completed the sign-up process.  If you do not sign up before the expiration date, you must request a new code. 
 
· JackBe Access Code: [email or web address] Expires: 4/17/2017  9:42 AM 
 
4. Enter the last four digits of your Social Security Number (xxxx) and Date of Birth (mm/dd/yyyy) as indicated and click Submit. You will be taken to the next sign-up page. 5. Create a JackBe ID. This will be your JackBe login ID and cannot be changed, so think of one that is secure and easy to remember. 6. Create a JackBe password. You can change your password at any time. 7. Enter your Password Reset Question and Answer. This can be used at a later time if you forget your password. 8. Enter your e-mail address. You will receive e-mail notification when new information is available in [address] 9. Click Sign Up. You can now view and download portions of your medical record. 10. Click the Download Summary menu link to download a portable copy of your medical information. If you have questions, please visit the Frequently Asked Questions section of the JackBe website. Remember, JackBe is NOT to be used for urgent needs. For medical emergencies, dial 911. Now available from your iPhone and Android! Please provide this summary of care documentation to your next provider. Your primary care clinician is listed as Oscar Peng. If you have any questions after today's visit, please call [phone].

## 2017-01-18 NOTE — TELEPHONE ENCOUNTER
[facility] called and left a message on patients voicemail. Additional attempts to contact patient will be made.     Thank you,  Janelle Smith

## 2017-02-13 NOTE — TELEPHONE ENCOUNTER
----- Message from Neli Wren NP sent at 2/13/2017  9:03 AM EST -----  Sami Burrows,    Please ask Mr. Barahona to have a BMP done as soon as he is able. His labs from Jan. Showed an elevated potassium. Have him hold his potassium for now.       Thanks,  Geno Cleary

## 2017-02-13 NOTE — TELEPHONE ENCOUNTER
Called patient. He states he will have BMP drawn at Dr. Xavier Brody's office when he goes for his appointment on Friday. I asked him to move up the appointment if any way possible. He also states bp today was 108/65 yesterday 125/65. He has not yet started losartin. He wants to know should he hold off on taking .

## 2017-02-28 NOTE — PATIENT INSTRUCTIONS
Take bumetanide 2mg twice a day for 2 days only and then back to 1mg twice a day  All other medications to remain the same  Weigh daily and record  Limit sodium intake to 2000mg per day  Limit fluid intake to no more than  6, eight ounce glasses of any type of fluids per day  Call if you notice sudden or progressive weight gain (3-5 pounds in 2-3 days), increasing shortness of breath, abdominal bloating, increasing lower extremity edema, inability to lie flat or on your normal number of pillows, having to sit up to catch your breath, fatigue, increased somnolence (sleeping more), poor appetite

## 2017-02-28 NOTE — PROGRESS NOTES
Gustavo Bravo presents today for evaluation of complaints of fatigue. He denies chest pain and shortness of breath but complains of fatigue. He monitors his weight and blood pressures and heart rate on a daily basis and since February 13, 2017, he is up 8 pounds based on his home scale on 13 February, he weighed 128 pounds and this morning, February 28, he weighs 136 pounds. Based on our office scale, I last saw him on January 17, 2017 and his weight at that time was 134 pounds. Today, he weighs 142 pounds. He is an 80year old male with a history of CAD (s/p CABG in 1992). His bypasses include LIMA to LAD, lateral branch, sequential SVG to OM1 and OM 2, and sequential SVG to distal RCA and PDA. He had stent to his SVG to PDA back in 2011 when he presented with severe fatigue, exertional dyspnea, and decreased exercise capacity without chest pains. He was also noted to have significant mitral regurgitation. After his SVG to PDA stent, his mitral regurgitation improved to mild from severe previously. In retrospect, his mitral regurgitation was likely ischemic in origin, with resolution after revascularization of his SVG to RCA. He has never had chest pain even with significant coronary ischemia. His angina equivalent is severe dyspnea. His nuclear scan performed in December of 2014 revealed decline in his ejection fraction since February of 2012 from 47% on 35%. His echocardiogram at that time showed moderate to severe mitral regurgitation. His ejection fraction by echo was 45-50%. On 3/12/15, he underwent cardiac catheterization and it demonstrated no significant progressive vein graft disease. His native coronary artery disease has progressed since 2011 but there were no lesions that can or should be revascularized at the time of the cath. However, his overall LV systolic function has worsened.   On 3/26/15, he underwent AV optimization and his settings were changed from 120 msec AV delay to a 90 msec AV delay. On 8/10/16, he was preparing to  his dentures and he had an episode where he wanted to say something \"but I couldn't get the words out. \"  He stated that he had no other neurological symptoms, no weakness or facial drooping. It lasted for a few minutes and then everything was back to normal.  He then went to lunch with his significant other and they ran errands. It wasn't until later that evening that he realized what may have occurred and his daughter, who is a nurse, found out about what happened. She called and told him to go the ER which he did the next day. He went to [facility] and from there, he was sent to [facility] and admitted for testing. The hospitalist who saw him at [facility], Dr. Hussein Yost, scheduled him to see a vascular surgeon, Dr. Johanny Hector on 16. A CTA of the head and neck showed extensive heterogeneous plaque involving the common carotid arteries and internal carotid arteries. Stenosis approaches high-grade on the right at 64% with mild to moderate stenosis on the left, 40% by NASCET criteria. However, Mr. Wilber Ruiz does not want to see this surgeon. He requested an appointment with Dr. Lucio Ivy to get his opinion on what he should do. He saw Dr. Lucio Ivy on 16 and recommended neurology follow-up with Dr. Wei Lewis. Further recommendations to follow depending on neurology's recommendations (please see Dr. Corazon Stephenson most recent office note). On 10/13/16, he underwent cardiac catheterization after a nuclear scan showed inferior and inferolateral perfusion defect and he had worsening CHF and mitral regurgitation which was his previous anginal equivalent. The cath demonstrated the followin. Severe native CAD with occluded LAD, circumflex, and mid right coronary   artery. 2. LIMA to LAD is widely patent. 3. SVG to OM1 and OM2 is widely patent. 4. SVG to RPDA and PL branch has 80% lesion at the site of the insertion to   the RPDA.  This is likely his culprit lesion. He underwent balloon angioplasty and stenting with Xience JOSE (18 mm length) to the SVG to RPDA, PL branch to residual 0%. During his most recent hospitalization in December 2016 he was treated with IV bumetanide but had decreased urine output requiring the addition of milrinone. As soon as the milrinone was discontinued, his symptoms returned. Appears that prior to discharge, possible outpatient IV milrinone therapy was discussed with him and his daughter and arrangements for this are currently being investigated. He was discharged home on Bumex 1 mg twice daily and according to  Bubba Keshia, his weight has been stable at home. He states that he is feeling okay but has been tiring easily over the past 2 weeks. He denies chest pain, tightness, heaviness, and palpitations. He denies shortness of breath at rest, denies dyspnea on exertion, he admits to 2-3 pillow orthopnea and denies PND. He denies abdominal bloating. He denies lightheadedness, dizziness, and syncope. He admits to mild lower extremity edema especially around the ankles and denies claudication. Denies nausea, vomiting, diarrhea, fever, chills. While reviewing his medications, it was noted that his potassium and losartan are currently on hold. He reports that Dr. Emily Anderson did lab work on February 17, 2017 and we will call his office to try to get a copy of the results. PMH:  Past Medical History:   Diagnosis Date    Aortic insufficiency     stable    Bilateral renal artery stenosis (HCC)     CAD (coronary artery disease)     Appears stable. Status post CABG in 1992, with LIMA to LAD and diagonal branch, sequential SVG to OM1 and OM2, and sequential SVG to distal PCA and PDA.  Cancer [facility])     Prostate, now in remission.  Cardiac catheterization 10/13/2016    Severe native CAD. LAD, Cx, & mRCA 100%. LIMA to LAD patent. SVG to OM1 & OM2 patent.   SVG to RPDA and PLB 80% at insert site to RPDA (3 x 18-mm Xience JOSE, resid 0%).  Cardiac echocardiogram abnormal 10/05/2016    Mod LVE. EF 35% (prev 40% on 11/5/15). Severe basal-mid inferior & basal-mid inferolateral hypk. Mild LVH. Gr 3 DDfx. Mild RVE. RVSP 65 mmHg. Severe LAE. Mild ESTELA. Severe MR. Mod AI. Mod-severe TR.  IVCE. AI, MR, PAP have worsened since study of 11/5/15.  Cardiac nuclear imaging test, high risk 10/05/2016    High risk. Lg fixed basal inferior, mid inferior & inferolateral defect related to infarction w/o anirudh-infarct ischemia in RCA. Sm, fixed high anterolateral defect, likely infarction w/o ischemia. Massive LVE. Mod inferior, inferolateral hypk. Marked hypk of whole apical complex. EF 27% (prev 35% in study of 12/2014).  Cardiovascular LE venous duplex 11/02/2016    Right leg:  No DVT or venous reflux. Left leg:  Deep venous reflux of mid fem, popliteal AK and BK. Reflux time 2.0 secs.  Cardiovascular renal duplex 05/06/2015    No significant RA stenosis.  Carotid duplex 08/19/2016    Mod 50-69% bilateral ICA stenosis. Probable significant bilateral ECA stenosis. Less than 50% stenosis of both subclavians. Mod calcified plaque in bilateral CCA & bilateral ICA & bulb. No signficant chg from study of 9/10/15.  Dyslipidemia     on Zocor.  Gastroenteritis 7/2010    Hospitalized and given IV fluids and released.  Hearing loss in right ear     Hydronephrosis     bilateral    Impotence of organic origin     Ischemic cardiomyopathy     Severe, at least symptomatically, appears to be doing better.  Malignant neoplasm of prostate ([address]     Mitral regurgitation     at one time, he has severe mitral regurgitation. However, after his SVG to PDA stent, his mitral regurgitation improved only mild. His last echocardiogram of 2012 showed mild MR.     Status post insertion of drug eluting coronary artery stent 1/2011    SVG to PDA seq (PLB) stented with 2.75mm Cypher    Stroke [facility]) 08/10/2016 PSH:  Past Surgical History:   Procedure Laterality Date    CABG, ARTERIAL, FOUR+      LIMA to LAD; SVG to OM1 and OM2; SVG to PDA and PLB.  HX CHOLECYSTECTOMY      HX HEART CATHETERIZATION  2011    Severe native three vessel disease. LIMA to LAD; SVG to OM1 and OM2; SVG to PDA and PLB with JOSE stent.  HX HERNIA REPAIR   and     Hiatal hernia repair    HX OTHER SURGICAL      AICD implantation    HX PACEMAKER  10/23/09    Upgrade to Guidant single-chamber ICD to [employer] biventricular pacemaker ICD with atrial lead insertion        MEDS:  Current Outpatient Prescriptions   Medication Sig    LORazepam (ATIVAN) 0.5 mg tablet Take 0.5 mg by mouth nightly.  carvedilol (COREG) 25 mg tablet TAKE 1 TABLET TWICE DAILY    Walker misc Wheeled walker    bacitracin (BACITRACIN) ointment Apply 500 Units to affected area two (2) times a day.  bumetanide (BUMEX) 1 mg tablet Take 1 Tab by mouth two (2) times a day.  levothyroxine (SYNTHROID) 50 mcg tablet Take 1 Tab by mouth Daily (before breakfast).  spironolactone (ALDACTONE) 25 mg tablet Take 1 Tab by mouth daily. (Patient taking differently: Take 25 mg by mouth two (2) times a day.)    simvastatin (ZOCOR) 40 mg tablet Take 1 Tab by mouth nightly.  digoxin (LANOXIN) 0.125 mg tablet TAKE 1 TABLET EVERY DAY    clopidogrel (PLAVIX) 75 mg tablet TAKE 1 TABLET EVERY DAY    multivitamin (ONE A DAY) tablet Take 1 Tab by mouth daily.  aspirin 81 mg tablet Take 81 mg by mouth daily. No current facility-administered medications for this visit. Allergies and Sensitivities:  Allergies   Allergen Reactions    Lisinopril Cough       Family History:  Family History   Problem Relation Age of Onset    Heart Disease Mother     Heart Failure Father     Other Brother 76     heart transplant, later        Social History:  He  reports that he has never smoked.  He has never used smokeless tobacco.  He  reports that he does not drink alcohol. Physical:  Visit Vitals    /52    Pulse 70    Ht 5' 7\" (1.702 m)    Wt 64.4 kg (142 lb)    SpO2 97%    BMI 22.24 kg/m2         His weight is up 8 lbs since his last appointment. Exam:  Neck:  Supple, no JVD, soft right carotid bruit  CV:  Normal S1 and  S2, grade II/VI JEVON noted, no rubs, or gallops noted  Lungs:  Clear to ausculation throughout, no wheezes or rales  Abd:  Soft, non-tender, non-distended with good bowel sounds. No hepatosplenomegaly  Extremities:  1+ softly pitting lower extremity edema      Data:  EKG:   Not done today      LABS:  Lab Results   Component Value Date/Time    Sodium 133 01/17/2017 12:00 AM    Potassium 6.2 01/17/2017 12:00 AM    Chloride 93 01/17/2017 12:00 AM    CO2 22 01/17/2017 12:00 AM    Glucose 96 01/17/2017 12:00 AM    BUN 40 01/17/2017 12:00 AM    Creatinine 1.27 01/17/2017 12:00 AM     No results found for: CHOL  Lab Results   Component Value Date/Time    ALT (SGPT) 41 12/19/2016 05:45 PM       Impression / Plan:  1.  CAD, s/p CABG in 1992, stable, s/p recent PCI/stent to the SVG to RPDA sequential to RPL  2. Ischemic cardiomyopathy, s/p Bi-V/ICD (upgrade in 2009), s/p AV optimization on 3/26/15  3. Dyslipidemia, on simvastatin 20mg  4. Mitral and aortic valve regurgitation  5. TIA, manifested as a short period of expressive aphasia  6. Carotid stenosis, high-grade on the right at 64% and mild to moderate on the left  7. Chronic systolic heart failure, appears slightly decompensated  8. Hypothyroidism, Synthroid recently increased to 50mcg per day (during hospitalization)    Mr. Prentiss Dubin was seen today for evaluation of complaints of fatigue. He states that over the past 2 weeks he has been feeling okay but has been tiring easily. He denies chest pain or any unusual shortness of breath. Based on his home scale, he is up 8 pounds in a 15 day period of time.   Based on our office scale, he is up 8 pounds since his last office visit on January 17, 2017. He reports that when he saw Dr. Matilda Hand about 2 weeks ago, he was instructed to continue holding his losartan and potassium. He also had lab work done at his office and we will call his office to try to obtain a copy of those results. We had requested a BMP to be done through Dr. Monica Person office a few weeks ago. His blood pressure and heart rate are well controlled. His breath sounds are clear and he has 1+ softly pitting lower extremity edema. He admits to intermittent abdominal bloating but no unusual shortness of breath. He was instructed to take bumetanide 2 mg twice a day for 2 days only and then back to 1 mg twice a day. I asked that he continue daily weights, sodium limitation and fluid limitation. He will call the office on Friday to let us know what his weight is after taking increased dose of bumetanide. Congestive heart failure teaching reinforced today. Advised to limit sodium intake to no more than 2000mg per day and to also limit his fluid intake. Advised to weigh daily every morning and record weights. Instructed to call our office if progressive weight gain is noted over a 2 to 3 day period of time, shortness of breath increases, or if abdominal bloating, nausea, fatigue, or increased lower extremity edema is noted. He will follow-up with Dr. Disha Jaquez as scheduled and PRN. He knows to call if he has any cardiac problems or concerns prior to his next scheduled appointment. He will follow-up in the device clinic as scheduled and PRN. Lower extremity venous duplex study was completed on 11/2/16 and it showed no DVTs but venous insufficiency was noted on the left.         Zion Concepcion MSN, FNP-BC

## 2017-02-28 NOTE — MR AVS SNAPSHOT
Visit Information Date & Time Provider Department Dept. Phone Encounter #  
 2/28/2017  2:30 PM Trixie Velazquez, [facility] [address] [id number] Your Appointments 3/28/2017  1:40 PM  
Follow Up with Grace Field MD  
[facility] ([facility]) Appt Note: 6 month follow up; 2-3 month f/up [facility] [address] [address] [address] [phone] [place] [id number]  
  
    
 3/28/2017  1:40 PM  
PROCEDURE with Pacer Hv Csi [facility] ([facility]) Appt Note: 3 defib check-guidant [facility] [address] [address] [address] [phone] 2304 [facility] [address] Upcoming Health Maintenance Date Due DTaP/Tdap/Td series (1 - Tdap) 2[birth year]18/1949 ZOSTER VACCINE AGE 60> 2/18/1988 GLAUCOMA SCREENING Q2Y 2/18/1993 Pneumococcal 65+ High/Highest Risk (1 of 2 - PCV13) 2/18/1993 MEDICARE YEARLY EXAM 2/18/1993 INFLUENZA AGE 9 TO ADULT 8/1/2016 Allergies as of 2/28/2017  Review Complete On: 2/28/2017 By: Trixie Velazquez NP Severity Noted Reaction Type Reactions Lisinopril  01/23/2017    Cough Current Immunizations  Never Reviewed No immunizations on file. Not reviewed this visit Vitals BP  
  
  
  
  
  
 118/52 BMI and BSA Data Body Mass Index Body Surface Area  
 22.24 kg/m 2 1.74 m 2 Preferred Pharmacy Pharmacy Name Phone [address] [phone] Your Updated Medication List  
  
   
This list is accurate as of: 2/28/17  3:06 PM.  Always use your most recent med list.  
  
  
  
  
 aspirin 81 mg tablet Take 81 mg by mouth daily. bacitracin ointment Commonly known as:  BACITRACIN  
 Apply 500 Units to affected area two (2) times a day. bumetanide 1 mg tablet Commonly known as:  Mert Melo Take 1 Tab by mouth two (2) times a day. carvedilol 25 mg tablet Commonly known as:  COREG  
TAKE 1 TABLET TWICE DAILY  
  
 clopidogrel 75 mg Tab Commonly known as:  PLAVIX TAKE 1 TABLET EVERY DAY  
  
 digoxin 0.125 mg tablet Commonly known as:  LANOXIN  
TAKE 1 TABLET EVERY DAY  
  
 levothyroxine 50 mcg tablet Commonly known as:  SYNTHROID Take 1 Tab by mouth Daily (before breakfast). LORazepam 0.5 mg tablet Commonly known as:  ATIVAN Take 0.5 mg by mouth nightly. multivitamin tablet Commonly known as:  ONE A DAY Take 1 Tab by mouth daily. simvastatin 40 mg tablet Commonly known as:  ZOCOR Take 1 Tab by mouth nightly. spironolactone 25 mg tablet Commonly known as:  ALDACTONE Take 1 Tab by mouth daily. Cinthia Larsens Wheeled walker Patient Instructions Take bumetanide 2mg twice a day for 2 days only and then back to 1mg twice a day All other medications to remain the same Weigh daily and record Limit sodium intake to 2000mg per day Limit fluid intake to no more than  6, eight ounce glasses of any type of fluids per day Call if you notice sudden or progressive weight gain (3-5 pounds in 2-3 days), increasing shortness of breath, abdominal bloating, increasing lower extremity edema, inability to lie flat or on your normal number of pillows, having to sit up to catch your breath, fatigue, increased somnolence (sleeping more), poor appetite Introducing [facility]! Vivian Short introduces [employer] patient portal. Now you can access parts of your medical record, email your doctor's office, and request medication refills online. 1. In your internet browser, go to [email or web address] 2. Click on the First Time User? Click Here link in the Sign In box.  You will see the New Member Sign Up page. 3. Enter your [employer] Access Code exactly as it appears below. You will not need to use this code after youve completed the sign-up process. If you do not sign up before the expiration date, you must request a new code. · [employer] Access Code: [email or web address] Expires: 4/17/2017  9:42 AM 
 
4. Enter the last four digits of your Social Security Number (xxxx) and Date of Birth (mm/dd/yyyy) as indicated and click Submit. You will be taken to the next sign-up page. 5. Create a [employer] ID. This will be your [employer] login ID and cannot be changed, so think of one that is secure and easy to remember. 6. Create a [employer] password. You can change your password at any time. 7. Enter your Password Reset Question and Answer. This can be used at a later time if you forget your password. 8. Enter your e-mail address. You will receive e-mail notification when new information is available in [address] 9. Click Sign Up. You can now view and download portions of your medical record. 10. Click the Download Summary menu link to download a portable copy of your medical information. If you have questions, please visit the Frequently Asked Questions section of the [employer] website. Remember, [employer] is NOT to be used for urgent needs. For medical emergencies, dial 911. Now available from your iPhone and Android! Please provide this summary of care documentation to your next provider. Your primary care clinician is listed as Frederick Baig. If you have any questions after today's visit, please call [phone].

## 2017-03-03 NOTE — TELEPHONE ENCOUNTER
Patient called reporting current weight, states his weight this morning 135.4lb, denies SOB. Was told to call back to let you know.

## 2017-03-06 NOTE — TELEPHONE ENCOUNTER
Patient called to inform the office that he was still experiencing SOB. He went to Dr. Bro Sifuentes office where he had an Xray of his chest he had a little fluid build up. Patient said Dr. Roya Holbrook advised him to take Aldactone 25 mg  BID for 3 days and then resume normal regimen.

## 2017-03-07 NOTE — ED NOTES
Pt. Voided in a urinal with about 200mls of yellow urine. Will continue to monitor. MD notified, no orders at this time.

## 2017-03-07 NOTE — ED NOTES
Vitals:  Patient Vitals for the past 12 hrs:   Temp Pulse Resp BP SpO2   03/07/17 1532 97.4 °F (36.3 °C) 69 18 115/51 97 %   03/07/17 1215 - 70 16 121/63 95 %   03/07/17 1200 - 71 22 121/62 99 %   03/07/17 1145 - 71 16 118/54 98 %   03/07/17 1130 - 70 19 123/58 -   03/07/17 1115 - 70 19 112/80 -   03/07/17 1100 - 70 18 125/58 99 %   03/07/17 1045 - 72 16 128/63 100 %   03/07/17 0918 98.7 °F (37.1 °C) - - - -   03/07/17 0906 - 71 29 - -   03/07/17 0905 - - - - 94 %   03/07/17 0900 - - - 122/57 -   03/07/17 6851 - - - 126/69 -        Medications ordered:   Medications   technetium pentetate (Tc-DTPA) injection 51.7 millicurie (87.3 millicuries Inhalation Given 3/7/17 1407)   technetium albumin aggregated (MAA) solution 6.6 millicurie (6.6 millicuries IntraVENous Given 3/7/17 1445)         Lab findings:  Recent Results (from the past 12 hour(s))   CBC WITH AUTOMATED DIFF    Collection Time: 03/07/17  9:00 AM   Result Value Ref Range    WBC 7.2 4.6 - 13.2 K/uL    RBC 3.88 (L) 4.70 - 5.50 M/uL    HGB 14.0 13.0 - 16.0 g/dL    HCT 42.2 36.0 - 48.0 %    .8 (H) 74.0 - 97.0 FL    MCH 36.1 (H) 24.0 - 34.0 PG    MCHC 33.2 31.0 - 37.0 g/dL    RDW 17.7 (H) 11.6 - 14.5 %    PLATELET 498 (L) 998 - 420 K/uL    MPV 10.8 9.2 - 11.8 FL    NEUTROPHILS 70 40 - 73 %    LYMPHOCYTES 13 (L) 21 - 52 %    MONOCYTES 15 (H) 3 - 10 %    EOSINOPHILS 1 0 - 5 %    BASOPHILS 1 0 - 2 %    ABS. NEUTROPHILS 5.1 1.8 - 8.0 K/UL    ABS. LYMPHOCYTES 0.9 0.9 - 3.6 K/UL    ABS. MONOCYTES 1.1 0.05 - 1.2 K/UL    ABS. EOSINOPHILS 0.0 0.0 - 0.4 K/UL    ABS.  BASOPHILS 0.0 0.0 - 0.06 K/UL    DF AUTOMATED     PRO-BNP    Collection Time: 03/07/17  9:00 AM   Result Value Ref Range    NT pro-BNP 6176 (H) 0 - 8250 PG/ML   METABOLIC PANEL, BASIC    Collection Time: 03/07/17  9:00 AM   Result Value Ref Range    Sodium 137 136 - 145 mmol/L    Potassium 5.4 3.5 - 5.5 mmol/L    Chloride 99 (L) 100 - 108 mmol/L    CO2 28 21 - 32 mmol/L    Anion gap 10 3.0 - 18 mmol/L Glucose 143 (H) 74 - 99 mg/dL    BUN 41 (H) 7.0 - 18 MG/DL    Creatinine 1.76 (H) 0.6 - 1.3 MG/DL    BUN/Creatinine ratio 23 (H) 12 - 20      GFR est AA 44 (L) >60 ml/min/1.73m2    GFR est non-AA 37 (L) >60 ml/min/1.73m2    Calcium 9.2 8.5 - 10.1 MG/DL   CARDIAC PANEL,(CK, CKMB & TROPONIN)    Collection Time: 03/07/17  9:00 AM   Result Value Ref Range     39 - 308 U/L    CK - MB 1.8 <3.6 ng/ml    CK-MB Index 1.3 0.0 - 4.0 %    Troponin-I, Qt. 0.06 0.00 - 0.06 NG/ML   DIGOXIN    Collection Time: 03/07/17  9:00 AM   Result Value Ref Range    DIGOXIN 2.8 (HH) 0.9 - 2.0 NG/ML   D DIMER    Collection Time: 03/07/17  9:00 AM   Result Value Ref Range    D DIMER 1.10 (H) <0.46 ug/ml(FEU)   HEPATIC FUNCTION PANEL    Collection Time: 03/07/17  9:00 AM   Result Value Ref Range    Protein, total 7.1 6.4 - 8.2 g/dL    Albumin 4.0 3.4 - 5.0 g/dL    Globulin 3.1 2.0 - 4.0 g/dL    A-G Ratio 1.3 0.8 - 1.7      Bilirubin, total 1.8 (H) 0.2 - 1.0 MG/DL    Bilirubin, direct 0.3 (H) 0.0 - 0.2 MG/DL    Alk.  phosphatase 104 45 - 117 U/L    AST (SGOT) 64 (H) 15 - 37 U/L    ALT (SGPT) 30 16 - 61 U/L   EKG, 12 LEAD, INITIAL    Collection Time: 03/07/17  9:02 AM   Result Value Ref Range    Ventricular Rate 70 BPM    Atrial Rate 62 BPM    QRS Duration 182 ms    Q-T Interval 436 ms    QTC Calculation (Bezet) 470 ms    Calculated R Axis -94 degrees    Calculated T Axis 89 degrees    Diagnosis       Electronic ventricular pacemaker  When compared with ECG of 18-DEC-2016 00:41,  No significant change was found  Confirmed by Yvette Levy MD, Jossue Whitehead ([id number]) on 3/7/2017 2:25:08 PM     URINALYSIS W/ RFLX MICROSCOPIC    Collection Time: 03/07/17 11:35 AM   Result Value Ref Range    Color YELLOW      Appearance CLEAR      Specific gravity 1.011 1.005 - 1.030      pH (UA) 7.0 5.0 - 8.0      Protein NEGATIVE  NEG mg/dL    Glucose NEGATIVE  NEG mg/dL    Ketone NEGATIVE  NEG mg/dL    Bilirubin NEGATIVE  NEG      Blood NEGATIVE  NEG      Urobilinogen 1.0 0.2 - 1.0 EU/dL    Nitrites NEGATIVE  NEG      Leukocyte Esterase NEGATIVE  NEG         EKG interpretation by ED Physician:       X-Ray, CT or other radiology findings or impressions:  NM LUNG PERFUSION W VENT   Final Result      XR CHEST PA LAT   Final Result          Orders:  Orders Placed This Encounter    XR CHEST PA LAT     Standing Status:   Standing     Number of Occurrences:   1     Order Specific Question:   Transport     Answer:   Stretcher W/O2/IV [6]     Order Specific Question:   Reason for Exam     Answer:   sob,    NM LUNG PERFUSION W VENT     Valvular heart disease. Controlled congestive heart failure with increasing dyspnea. Elevated D-Dimer . R/o PE. Standing Status:   Standing     Number of Occurrences:   1     Order Specific Question:   Transport     Answer:   BED [2]    CBC WITH AUTOMATED DIFF     Standing Status:   Standing     Number of Occurrences:   1    Pro BNP     Standing Status:   Standing     Number of Occurrences:   1    METABOLIC PANEL, BASIC     Standing Status:   Standing     Number of Occurrences:   1    CARDIAC PANEL,(CK, CKMB & TROPONIN)     Standing Status:   Standing     Number of Occurrences:   1    DIGOXIN     Standing Status:   Standing     Number of Occurrences:   1    URINALYSIS W/ RFLX MICROSCOPIC     Standing Status:   Standing     Number of Occurrences:   1    D DIMER     Standing Status:   Standing     Number of Occurrences:   1    HEPATIC FUNCTION PANEL     Standing Status:   Standing     Number of Occurrences:   1    CARDIAC MONITORING     Standing Status:   Standing     Number of Occurrences:   1     Order Specific Question:   Type:      Answer:   Bedside    PULSE OXIMETRY CONTINUOUS     Standing Status:   Standing     Number of Occurrences:   1    WEIGH PATIENT     Standing Status:   Standing     Number of Occurrences:   1    EKG, 12 LEAD, INITIAL     Standing Status:   Standing     Number of Occurrences:   1     Order Specific Question:   Reason for Exam:     Answer:   SOB    SALINE LOCK IV ONE TIME STAT     Standing Status:   Standing     Number of Occurrences:   1    technetium pentetate (Tc-DTPA) injection 51.9 millicurie    DISCONTD: technetium albumin aggregated (MAA) solution 0.6 millicurie    technetium albumin aggregated (MAA) solution 6.6 millicurie       Reevaluation, Progress notes, Consult notes, or additional Procedure notes:   1:00 PM Dr. Milli Howard assumed care of the patient from Dr. Nima Chappell pending VQ scan.    3:57 PM I have reassessed the patient and discussed their results and diagnosis. Pt will be discharged in stable condition. Patient is to return to emergency department if any new or worsening condition. Patient understands and verbalizes agreement with plan. Disposition:  Diagnosis:   1. Dyspnea, unspecified type    2. H/O valvular heart disease    3. Systolic congestive heart failure, unspecified congestive heart failure chronicity ([address]        Disposition:     Follow-up Information     Follow up With Details Comments [address] MD   [facility] [place] [address]  [phone]             Patient's Medications   Start Taking    No medications on file   Continue Taking    ASPIRIN 81 MG TABLET    Take 81 mg by mouth daily. BACITRACIN (BACITRACIN) OINTMENT    Apply 500 Units to affected area two (2) times a day. BUMETANIDE (BUMEX) 1 MG TABLET    Take 1 Tab by mouth two (2) times a day. CARVEDILOL (COREG) 25 MG TABLET    TAKE 1 TABLET TWICE DAILY    CLOPIDOGREL (PLAVIX) 75 MG TABLET    TAKE 1 TABLET EVERY DAY    DIGOXIN (LANOXIN) 0.125 MG TABLET    TAKE 1 TABLET EVERY DAY    LEVOTHYROXINE (SYNTHROID) 50 MCG TABLET    Take 1 Tab by mouth Daily (before breakfast). LORAZEPAM (ATIVAN) 0.5 MG TABLET    Take 0.5 mg by mouth nightly. MULTIVITAMIN (ONE A DAY) TABLET    Take 1 Tab by mouth daily. SIMVASTATIN (ZOCOR) 40 MG TABLET    Take 1 Tab by mouth nightly.     SPIRONOLACTONE (ALDACTONE) 25 MG TABLET    Take 1 Tab by mouth daily. WALKER MISC    Wheeled walker   These Medications have changed    No medications on file   Stop Taking    No medications on file         Scribe Attestation  Vinicius May scribing for and in the presence of Michael Kennedy MD (03/07/17)      Physician Attestation  I personally performed the services described in this documentation, reviewed and edited the documentation which was dictated to the scribe in my presence, and it accurately records my words and actions.     Dr. Tereza Decker MD. (03/07/17)      Signed by: Tiffani Walker, March 07, 2017 at 3:57 PM

## 2017-03-07 NOTE — ED TRIAGE NOTES
Pt co  Sob  X 2 days states has gained fluid over last few days spoke with MD yest increased fluid med with no improvement

## 2017-03-07 NOTE — ED NOTES
Both written and verbal discharge instructions given to pt with verbalized understanding of home care and follow up.

## 2017-03-07 NOTE — ED NOTES
Per MD order, remove Oxygen for a trail, Pt was taken of oxygen and saturating at %, MD is at the bedside. No orders at this time. Will continue to monitor.

## 2017-03-07 NOTE — ED NOTES
TRANSFER - OUT REPORT:    Verbal report given to [address] [place] [address] RN(name) on Tim Morley.  being transferred to  Scan (unit) for ordered procedure       Report consisted of patients Situation, Background, Assessment and   Recommendations(SBAR). Information from the following report(s) SBAR, ED Summary, [facility] and Recent Results was reviewed with the receiving nurse. Lines:   Peripheral IV 03/07/17 Left Antecubital (Active)   Site Assessment Clean, dry, & intact 3/7/2017  9:08 AM   Phlebitis Assessment 0 3/7/2017  9:08 AM   Infiltration Assessment 0 3/7/2017  9:08 AM   Dressing Status Clean, dry, & intact 3/7/2017  9:08 AM   Dressing Type Transparent 3/7/2017  9:08 AM   Hub Color/Line Status Patent; Flushed 3/7/2017  9:08 AM        Opportunity for questions and clarification was provided.       Patient transported with:   Monitor  O2 @ 2 liters

## 2017-03-07 NOTE — ED PROVIDER NOTES
HPI Comments: 10:52 AM Tim Morley. is a 80 y.o. male with history of prostate cancer, CAD and stroke with no deficits who presents to the ED c/o SOB which has been going on for the past 'few days'. Pt was seen by Dr. Keyla Schulte, cardiologist, and was instructed to increase his px of spironolactone. After increasing, pt states he has gained 2 lbs in 2 days and now can not urinate. Pt also complains of left leg swelling. Pt denies any cough or any other symptoms at this time. All questions and comments addressed at this time. PCP: Lynnette Llamas MD      The history is provided by the patient. Past Medical History:   Diagnosis Date    Aortic insufficiency     stable    Bilateral renal artery stenosis (HCC)     CAD (coronary artery disease)     Appears stable. Status post CABG in 1992, with LIMA to LAD and diagonal branch, sequential SVG to OM1 and OM2, and sequential SVG to distal PCA and PDA.  Cancer [facility])     Prostate, now in remission.  Cardiac catheterization 10/13/2016    Severe native CAD. LAD, Cx, & mRCA 100%. LIMA to LAD patent. SVG to OM1 & OM2 patent. SVG to RPDA and PLB 80% at insert site to RPDA (3 x 18-mm Xience JOSE, resid 0%).  Cardiac echocardiogram abnormal 10/05/2016    Mod LVE. EF 35% (prev 40% on 11/5/15). Severe basal-mid inferior & basal-mid inferolateral hypk. Mild LVH. Gr 3 DDfx. Mild RVE. RVSP 65 mmHg. Severe LAE. Mild ESTELA. Severe MR. Mod AI. Mod-severe TR.  IVCE. AI, MR, PAP have worsened since study of 11/5/15.  Cardiac nuclear imaging test, high risk 10/05/2016    High risk. Lg fixed basal inferior, mid inferior & inferolateral defect related to infarction w/o anirudh-infarct ischemia in RCA. Sm, fixed high anterolateral defect, likely infarction w/o ischemia. Massive LVE. Mod inferior, inferolateral hypk. Marked hypk of whole apical complex. EF 27% (prev 35% in study of 12/2014).     Cardiovascular LE venous duplex 11/02/2016    Right leg:  No DVT or venous reflux. Left leg:  Deep venous reflux of mid fem, popliteal AK and BK. Reflux time 2.0 secs.  Cardiovascular renal duplex 2015    No significant RA stenosis.  Carotid duplex 2016    Mod 50-69% bilateral ICA stenosis. Probable significant bilateral ECA stenosis. Less than 50% stenosis of both subclavians. Mod calcified plaque in bilateral CCA & bilateral ICA & bulb. No signficant chg from study of 9/10/15.  Dyslipidemia     on Zocor.  Gastroenteritis 2010    Hospitalized and given IV fluids and released.  Hearing loss in right ear     Hydronephrosis     bilateral    Impotence of organic origin     Ischemic cardiomyopathy     Severe, at least symptomatically, appears to be doing better.  Malignant neoplasm of prostate ([address]     Mitral regurgitation     at one time, he has severe mitral regurgitation. However, after his SVG to PDA stent, his mitral regurgitation improved only mild. His last echocardiogram of  showed mild MR.  Status post insertion of drug eluting coronary artery stent 2011    SVG to PDA seq (PLB) stented with 2.75mm Cypher    Stroke [facility]) 08/10/2016       Past Surgical History:   Procedure Laterality Date    CABG, ARTERIAL, FOUR+      LIMA to LAD; SVG to OM1 and OM2; SVG to PDA and PLB.  HX CHOLECYSTECTOMY      HX HEART CATHETERIZATION  2011    Severe native three vessel disease. LIMA to LAD; SVG to OM1 and OM2; SVG to PDA and PLB with JOSE stent.     HX HERNIA REPAIR   and     Hiatal hernia repair    HX OTHER SURGICAL      AICD implantation    HX PACEMAKER  10/23/09    Upgrade to Guidant single-chamber ICD to [address] biventricular pacemaker ICD with atrial lead insertion          Family History:   Problem Relation Age of Onset    Heart Disease Mother     Heart Failure Father     Other Brother 76     heart transplant, later        Social History     Social History    Marital status:      Spouse name: N/A    Number of children: N/A    Years of education: N/A     Occupational History    Not on file. Social History Main Topics    Smoking status: Never Smoker    Smokeless tobacco: Never Used    Alcohol use No    Drug use: No    Sexual activity: Not on file     Other Topics Concern    Not on file     Social History Narrative         ALLERGIES: Lisinopril    Review of Systems   Constitutional: Negative for fever. HENT: Negative for congestion. Respiratory: Positive for shortness of breath. Negative for cough. Cardiovascular: Negative for chest pain and leg swelling. Gastrointestinal: Negative for abdominal pain, nausea and vomiting. Genitourinary: Negative for dysuria. Musculoskeletal: Negative. L leg swelling. Neurological: Negative for light-headedness and headaches. All other systems reviewed and are negative. Vitals:    03/07/17 0918 03/07/17 1100 03/07/17 1130 03/07/17 1145   BP:  125/58 123/58    Pulse:  70 70    Resp:  18 19    Temp: 98.7 °F (37.1 °C)      SpO2:  99%     Weight:    61.9 kg (136 lb 6.4 oz)   Height:    5' 7\" (1.702 m)            Physical Exam   Constitutional: He appears well-developed and well-nourished. No distress. HENT:   Head: Normocephalic. Right Ear: External ear normal.   Left Ear: External ear normal.   Mouth/Throat: No oropharyngeal exudate. Anurysmal dilatation of the right carotid artery. No bruit. Eyes: Conjunctivae and EOM are normal. Pupils are equal, round, and reactive to light. Right eye exhibits no discharge. Left eye exhibits no discharge. No scleral icterus. Neck: Normal range of motion. Neck supple. No tracheal deviation present. No thyromegaly present. Cardiovascular: Normal rate and regular rhythm. Exam reveals no gallop and no friction rub. Murmur heard. Holosystolic murmur at apex- Left lateral chest. PMI Mid axillary line.    Pulmonary/Chest: Effort normal and breath sounds normal. No stridor. No respiratory distress. He has no wheezes. He has no rales. He exhibits no tenderness. Abdominal: Soft. He exhibits no distension and no mass. There is no tenderness. There is no rebound and no guarding. Musculoskeletal: He exhibits edema. He exhibits no tenderness. 1 + edema. Lymphadenopathy:     He has no cervical adenopathy. Neurological: He is alert. He has normal reflexes. No cranial nerve deficit. Coordination normal.   Skin: Skin is warm. Psychiatric: He has a normal mood and affect. His behavior is normal. Judgment and thought content normal.        MDM  Number of Diagnoses or Management Options  Dyspnea, unspecified type:   H/O valvular heart disease:   Systolic congestive heart failure, unspecified congestive heart failure chronicity [facility]):   Diagnosis management comments: Imp: Chronic valvular disease with CHR , mild renal insufficiency , pre renal effect with diuretics- return to usual dose of diuretics, elevated digoxin- hold one dose,  mild elevation od D-Dimer r/o PE . Obtain Lung Scan.-renal protective.      ED Course       Procedures    Vitals:  Patient Vitals for the past 12 hrs:   Temp Pulse Resp BP SpO2   03/07/17 1130 - 70 19 123/58 -   03/07/17 1100 - 70 18 125/58 99 %   03/07/17 0918 98.7 °F (37.1 °C) - - - -   03/07/17 0906 - 71 29 - -   03/07/17 0905 - - - - 94 %   03/07/17 0900 - - - 122/57 -   03/07/17 8327 - - - 126/69 -        Medications ordered:   Medications - No data to display      Lab findings:  Recent Results (from the past 12 hour(s))   CBC WITH AUTOMATED DIFF    Collection Time: 03/07/17  9:00 AM   Result Value Ref Range    WBC 7.2 4.6 - 13.2 K/uL    RBC 3.88 (L) 4.70 - 5.50 M/uL    HGB 14.0 13.0 - 16.0 g/dL    HCT 42.2 36.0 - 48.0 %    .8 (H) 74.0 - 97.0 FL    MCH 36.1 (H) 24.0 - 34.0 PG    MCHC 33.2 31.0 - 37.0 g/dL    RDW 17.7 (H) 11.6 - 14.5 %    PLATELET 673 (L) 468 - 420 K/uL    MPV 10.8 9.2 - 11.8 FL    NEUTROPHILS 70 40 - 73 %    LYMPHOCYTES 13 (L) 21 - 52 %    MONOCYTES 15 (H) 3 - 10 %    EOSINOPHILS 1 0 - 5 %    BASOPHILS 1 0 - 2 %    ABS. NEUTROPHILS 5.1 1.8 - 8.0 K/UL    ABS. LYMPHOCYTES 0.9 0.9 - 3.6 K/UL    ABS. MONOCYTES 1.1 0.05 - 1.2 K/UL    ABS. EOSINOPHILS 0.0 0.0 - 0.4 K/UL    ABS. BASOPHILS 0.0 0.0 - 0.06 K/UL    DF AUTOMATED     PRO-BNP    Collection Time: 03/07/17  9:00 AM   Result Value Ref Range    NT pro-BNP 6176 (H) 0 - 8158 PG/ML   METABOLIC PANEL, BASIC    Collection Time: 03/07/17  9:00 AM   Result Value Ref Range    Sodium 137 136 - 145 mmol/L    Potassium 5.4 3.5 - 5.5 mmol/L    Chloride 99 (L) 100 - 108 mmol/L    CO2 28 21 - 32 mmol/L    Anion gap 10 3.0 - 18 mmol/L    Glucose 143 (H) 74 - 99 mg/dL    BUN 41 (H) 7.0 - 18 MG/DL    Creatinine 1.76 (H) 0.6 - 1.3 MG/DL    BUN/Creatinine ratio 23 (H) 12 - 20      GFR est AA 44 (L) >60 ml/min/1.73m2    GFR est non-AA 37 (L) >60 ml/min/1.73m2    Calcium 9.2 8.5 - 10.1 MG/DL   CARDIAC PANEL,(CK, CKMB & TROPONIN)    Collection Time: 03/07/17  9:00 AM   Result Value Ref Range     39 - 308 U/L    CK - MB 1.8 <3.6 ng/ml    CK-MB Index 1.3 0.0 - 4.0 %    Troponin-I, Qt. 0.06 0.00 - 0.06 NG/ML   DIGOXIN    Collection Time: 03/07/17  9:00 AM   Result Value Ref Range    DIGOXIN 2.8 (HH) 0.9 - 2.0 NG/ML   D DIMER    Collection Time: 03/07/17  9:00 AM   Result Value Ref Range    D DIMER 1.10 (H) <0.46 ug/ml(FEU)   HEPATIC FUNCTION PANEL    Collection Time: 03/07/17  9:00 AM   Result Value Ref Range    Protein, total 7.1 6.4 - 8.2 g/dL    Albumin 4.0 3.4 - 5.0 g/dL    Globulin 3.1 2.0 - 4.0 g/dL    A-G Ratio 1.3 0.8 - 1.7      Bilirubin, total 1.8 (H) 0.2 - 1.0 MG/DL    Bilirubin, direct 0.3 (H) 0.0 - 0.2 MG/DL    Alk.  phosphatase 104 45 - 117 U/L    AST (SGOT) 64 (H) 15 - 37 U/L    ALT (SGPT) 30 16 - 61 U/L   EKG, 12 LEAD, INITIAL    Collection Time: 03/07/17  9:02 AM   Result Value Ref Range    Ventricular Rate 70 BPM    Atrial Rate 62 BPM    QRS Duration 182 ms    Q-T Interval 436 ms QTC Calculation (Bezet) 470 ms    Calculated R Axis -94 degrees    Calculated T Axis 89 degrees    Diagnosis       Electronic ventricular pacemaker  When compared with ECG of 18-DEC-2016 00:41,  No significant change was found     URINALYSIS W/ RFLX MICROSCOPIC    Collection Time: 03/07/17 11:35 AM   Result Value Ref Range    Color YELLOW      Appearance CLEAR      Specific gravity 1.011 1.005 - 1.030      pH (UA) 7.0 5.0 - 8.0      Protein NEGATIVE  NEG mg/dL    Glucose NEGATIVE  NEG mg/dL    Ketone NEGATIVE  NEG mg/dL    Bilirubin NEGATIVE  NEG      Blood NEGATIVE  NEG      Urobilinogen 1.0 0.2 - 1.0 EU/dL    Nitrites NEGATIVE  NEG      Leukocyte Esterase NEGATIVE  NEG         EKG interpretation by ED Physician:       X-Ray, CT or other radiology findings or impressions:  XR CHEST PA LAT   IMPRESSION:     Mild increase in very small bilateral pleural effusions. Stable mild cardiomegaly.   Mild cephalization of pulmonary vascular flow  Per Radiologist          Orders:  Orders Placed This Encounter    XR CHEST PA LAT     Standing Status:   Standing     Number of Occurrences:   1     Order Specific Question:   Transport     Answer:   Stretcher W/O2/IV [6]     Order Specific Question:   Reason for Exam     Answer:   sob,    CBC WITH AUTOMATED DIFF     Standing Status:   Standing     Number of Occurrences:   1    Pro BNP     Standing Status:   Standing     Number of Occurrences:   1    METABOLIC PANEL, BASIC     Standing Status:   Standing     Number of Occurrences:   1    CARDIAC PANEL,(CK, CKMB & TROPONIN)     Standing Status:   Standing     Number of Occurrences:   1    DIGOXIN     Standing Status:   Standing     Number of Occurrences:   1    URINALYSIS W/ RFLX MICROSCOPIC     Standing Status:   Standing     Number of Occurrences:   1    D DIMER     Standing Status:   Standing     Number of Occurrences:   1    HEPATIC FUNCTION PANEL     Standing Status:   Standing     Number of Occurrences:   1    CARDIAC MONITORING     Standing Status:   Standing     Number of Occurrences:   1     Order Specific Question:   Type: Answer:   Bedside    PULSE OXIMETRY CONTINUOUS     Standing Status:   Standing     Number of Occurrences:   1    WEIGH PATIENT     Standing Status:   Standing     Number of Occurrences:   1    EKG, 12 LEAD, INITIAL     Standing Status:   Standing     Number of Occurrences:   1     Order Specific Question:   Reason for Exam:     Answer:   SOB    SALINE LOCK IV ONE TIME STAT     Standing Status:   Standing     Number of Occurrences:   1       Reevaluation, Progress notes, Consult notes, or additional Procedure notes: Will consult Dr Roya Holbrook.  12:34 PM Consult with Dr. Roya Holbrook (cardiologist) who requested a lung scan which will be obtained at [facility]. Patient is aware and agrees. Patient transferred to [facility] - ED MD notified.- To undergo Lung Scanning to,mr/o PE. Will return here for final disposition. Disposition:  Diagnosis:   1. Dyspnea, unspecified type    2. H/O valvular heart disease    3. Systolic congestive heart failure, unspecified congestive heart failure chronicity ([address]        Disposition:     Follow-up Information     Follow up With Details Comments [address] MD   [facility] [address]  [phone]             Patient's Medications   Start Taking    No medications on file   Continue Taking    ASPIRIN 81 MG TABLET    Take 81 mg by mouth daily. BACITRACIN (BACITRACIN) OINTMENT    Apply 500 Units to affected area two (2) times a day. BUMETANIDE (BUMEX) 1 MG TABLET    Take 1 Tab by mouth two (2) times a day. CARVEDILOL (COREG) 25 MG TABLET    TAKE 1 TABLET TWICE DAILY    CLOPIDOGREL (PLAVIX) 75 MG TABLET    TAKE 1 TABLET EVERY DAY    DIGOXIN (LANOXIN) 0.125 MG TABLET    TAKE 1 TABLET EVERY DAY    LEVOTHYROXINE (SYNTHROID) 50 MCG TABLET    Take 1 Tab by mouth Daily (before breakfast). LORAZEPAM (ATIVAN) 0.5 MG TABLET    Take 0.5 mg by mouth nightly. MULTIVITAMIN (ONE A DAY) TABLET    Take 1 Tab by mouth daily. SIMVASTATIN (ZOCOR) 40 MG TABLET    Take 1 Tab by mouth nightly. SPIRONOLACTONE (ALDACTONE) 25 MG TABLET    Take 1 Tab by mouth daily. WALKER MISC    Wheeled walker   These Medications have changed    No medications on file   Stop Taking    No medications on file         Scribe Attestation  Joselo Smith scribing for and in the presence of Leela Valladares MD (03/07/17)      Physician Attestation  I personally performed the services described in this documentation, reviewed and edited the documentation which was dictated to the scribe in my presence, and it accurately records my words and actions.     Leela Valladares MD (03/07/17)      Signed by: Tiffani Crook, March 07, 2017 at 12:20 PM

## 2017-03-07 NOTE — DISCHARGE INSTRUCTIONS
Shortness of Breath: Care Instructions  Your Care Instructions  Shortness of breath has many causes. Sometimes conditions such as anxiety can lead to shortness of breath. Some people get mild shortness of breath when they exercise. Trouble breathing also can be a symptom of a serious problem, such as asthma, lung disease, emphysema, heart problems, and pneumonia. If your shortness of breath continues, you may need tests and treatment. Watch for any changes in your breathing and other symptoms. Follow-up care is a key part of your treatment and safety. Be sure to make and go to all appointments, and call your doctor if you are having problems. Its also a good idea to know your test results and keep a list of the medicines you take. How can you care for yourself at home? · Do not smoke or allow others to smoke around you. If you need help quitting, talk to your doctor about stop-smoking programs and medicines. These can increase your chances of quitting for good. · Get plenty of rest and sleep. · Take your medicines exactly as prescribed. Call your doctor if you think you are having a problem with your medicine. · Find healthy ways to deal with stress. ¨ Exercise daily. ¨ Get plenty of sleep. ¨ Eat regularly and well. When should you call for help? Call 911 anytime you think you may need emergency care. For example, call if:  · You have severe shortness of breath. · You have symptoms of a heart attack. These may include:  ¨ Chest pain or pressure, or a strange feeling in the chest.  ¨ Sweating. ¨ Shortness of breath. ¨ Nausea or vomiting. ¨ Pain, pressure, or a strange feeling in the back, neck, jaw, or upper belly or in one or both shoulders or arms. ¨ Lightheadedness or sudden weakness. ¨ A fast or irregular heartbeat. After you call 911, the  may tell you to chew 1 adult-strength or 2 to 4 low-dose aspirin. Wait for an ambulance. Do not try to drive yourself.   Call your doctor now or seek immediate medical care if:  · Your shortness of breath gets worse or you start to wheeze. Wheezing is a high-pitched sound when you breathe. · You wake up at night out of breath or have to prop your head up on several pillows to breathe. · You are short of breath after only light activity or while at rest.  Watch closely for changes in your health, and be sure to contact your doctor if:  · You do not get better over the next 1 to 2 days. Where can you learn more? Go to http://krista-jacqueline.info/. Enter S780 in the search box to learn more about \"Shortness of Breath: Care Instructions. \"  Current as of: May 23, 2016  Content Version: 11.1  © 1874-1971 [employer]. Care instructions adapted under license by [employer] (which disclaims liability or warranty for this information). If you have questions about a medical condition or this instruction, always ask your healthcare professional. [place] [address] any warranty or liability for your use of this information. Follow up with Dr Madhu Ambriz- Elevated D-Dimer- Obtain Lung Scan. Hold Lanoxin for one dose tomorrow. Heart Failure: Care Instructions  Your Care Instructions    Heart failure occurs when your heart does not pump as much blood as the body needs. Failure does not mean that the heart has stopped pumping but rather that it is not pumping as well as it should. Over time, this causes fluid buildup in your lungs and other parts of your body. Fluid buildup can cause shortness of breath, fatigue, swollen ankles, and other problems. By taking medicines regularly, reducing sodium (salt) in your diet, checking your weight every day, and making lifestyle changes, you can feel better and live longer. Follow-up care is a key part of your treatment and safety. Be sure to make and go to all appointments, and call your doctor if you are having problems.  It's also a good idea to know your test results and keep a list of the medicines you take. How can you care for yourself at home? Medicines  · Be safe with medicines. Take your medicines exactly as prescribed. Call your doctor if you think you are having a problem with your medicine. · Do not take any vitamins, over-the-counter medicine, or herbal products without talking to your doctor first. Bonnie Score not take ibuprofen (Advil or Motrin) and naproxen (Aleve) without talking to your doctor first. They could make your heart failure worse. · You may be taking some of the following medicine. ¨ Beta-blockers can slow heart rate, decrease blood pressure, and improve your condition. Taking a beta-blocker may lower your chance of needing to be hospitalized. ¨ Angiotensin-converting enzyme inhibitors (ACEIs) reduce the heart's workload, lower blood pressure, and reduce swelling. Taking an ACEI may lower your chance of needing to be hospitalized again. ¨ Angiotensin II receptor blockers (ARBs) work like ACEIs. Your doctor may prescribe them instead of ACEIs. ¨ Diuretics, also called water pills, reduce swelling. ¨ Potassium supplements replace this important mineral, which is sometimes lost with diuretics. ¨ Aspirin and other blood thinners prevent blood clots, which can cause a stroke or heart attack. You will get more details on the specific medicines your doctor prescribes. Diet  · Your doctor may suggest that you limit sodium to 2,000 milligrams (mg) a day or less. That is less than 1 teaspoon of salt a day, including all the salt you eat in cooking or in packaged foods. People get most of their sodium from processed foods. Fast food and restaurant meals also tend to be very high in sodium. · Ask your doctor how much liquid you can drink each day. You may have to limit liquids. Weight  · Weigh yourself without clothing at the same time each day. Record your weight. Call your doctor if you gain more than 3 pounds in 2 to 3 days.  A sudden weight gain may mean that your heart failure is getting worse. Activity level  · Start light exercise (if your doctor says it is okay). Even if you can only do a small amount, exercise will help you get stronger, have more energy, and manage your weight and your stress. Walking is an easy way to get exercise. Start out by walking a little more than you did before. Bit by bit, increase the amount you walk. · When you exercise, watch for signs that your heart is working too hard. You are pushing yourself too hard if you cannot talk while you are exercising. If you become short of breath or dizzy or have chest pain, stop, sit down, and rest.  · If you feel \"wiped out\" the day after you exercise, walk slower or for a shorter distance until you can work up to a better pace. · Get enough rest at night. Sleeping with 1 or 2 pillows under your upper body and head may help you breathe easier. Lifestyle changes  · Do not smoke. Smoking can make a heart condition worse. If you need help quitting, talk to your doctor about stop-smoking programs and medicines. These can increase your chances of quitting for good. Quitting smoking may be the most important step you can take to protect your heart. · Limit alcohol to 2 drinks a day for men and 1 drink a day for women. Too much alcohol can cause health problems. · Avoid getting sick from colds and the flu. Get a pneumococcal vaccine shot. If you have had one before, ask your doctor whether you need another dose. Get a flu shot each year. If you must be around people with colds or the flu, wash your hands often. When should you call for help? Call 911 if you have symptoms of sudden heart failure such as:  · You have severe trouble breathing. · You cough up pink, foamy mucus. · You have a new irregular or rapid heartbeat. Call your doctor now or seek immediate medical care if:  · You have new or increased shortness of breath.   · You are dizzy or lightheaded, or you feel like you may faint.  · You have sudden weight gain, such as 3 pounds or more in 2 to 3 days. · You have increased swelling in your legs, ankles, or feet. · You are suddenly so tired or weak that you cannot do your usual activities. Watch closely for changes in your health, and be sure to contact your doctor if:  · You develop new symptoms. Where can you learn more? Go to http://krista-jacqueline.info/. Enter Y876 in the search box to learn more about \"Heart Failure: Care Instructions. \"  Current as of: January 27, 2016  Content Version: 11.1  © 4595-0722 [employer]. Care instructions adapted under license by [employer] (which disclaims liability or warranty for this information). If you have questions about a medical condition or this instruction, always ask your healthcare professional. [address] any warranty or liability for your use of this information.

## 2017-03-10 NOTE — PROGRESS NOTES
PATIENT NAME: Cherylene Sack.         80 y.o.      2/18/1928              DATE:3/10/2017    REASON FOR VISIT: Shortness of breath    HISTORY OF PRESENT ILLNESS: 77-year-old man with an ischemic cardiomyopathy. 2-3 week history of deterioration in his breathing. Short of breath on minimal exertion. Orthopnea. Denies chest pain. Denies palpitation, syncope, presyncope. He is status post AICD implantation. Status post coronary artery bypass surgery. Had angioplasty of a vein graft to the right coronary artery in October of last year. While walking from the waiting room to the exam room, the patient became severely dyspneic. His oxygen saturation fell to 84. Repeat in a minute or 2 was 87. After a few minutes of rest it stephane to 94. Patient was given 2 L of nasal O2 and ambulated in the grijalva maintaining his oxygen saturation at 96. PAST MEDICAL HISTORY:   Past Medical History:  No date: Aortic insufficiency      Comment: stable  No date: Bilateral renal artery stenosis (HCC)  No date: CAD (coronary artery disease)      Comment: Appears stable. Status post CABG in 1992,                with LIMA to LAD and diagonal branch,                sequential SVG to OM1 and OM2, and sequential                SVG to distal PCA and PDA. No date: Cancer [facility])      Comment: Prostate, now in remission. 10/13/2016: Cardiac catheterization      Comment: Severe native CAD. LAD, Cx, & mRCA 100%. LIMA to LAD patent. SVG to OM1 & OM2 patent. SVG to RPDA and PLB 80% at insert site to RPDA                (3 x 18-mm Xience JOSE, resid 0%). 10/05/2016: Cardiac echocardiogram abnormal      Comment: Mod LVE. EF 35% (prev 40% on 11/5/15). Severe basal-mid inferior & basal-mid                inferolateral hypk. Mild LVH. Gr 3 DDfx. Mild RVE. RVSP 65 mmHg. Severe LAE. Mild                ESTELA. Severe MR. Mod AI. Mod-severe TR. IVCE.  AI, MR, PAP have worsened since study of               11/5/15.  10/05/2016: Cardiac nuclear imaging test, high risk      Comment: High risk. Lg fixed basal inferior, mid                inferior & inferolateral defect related to                infarction w/o anirudh-infarct ischemia in RCA. Sm, fixed high anterolateral defect, likely                infarction w/o ischemia. Massive LVE. Mod                inferior, inferolateral hypk. Marked hypk of                whole apical complex. EF 27% (prev 35% in                study of 12/2014). 11/02/2016: Cardiovascular LE venous duplex      Comment: Right leg:  No DVT or venous reflux. Left                leg:  Deep venous reflux of mid fem, popliteal                AK and BK. Reflux time 2.0 secs. 05/06/2015: Cardiovascular renal duplex      Comment: No significant RA stenosis. 08/19/2016: Carotid duplex      Comment: Mod 50-69% bilateral ICA stenosis. Probable                significant bilateral ECA stenosis. Less than                50% stenosis of both subclavians. Mod                calcified plaque in bilateral CCA & bilateral                ICA & bulb. No signficant chg from study of                9/10/15. No date: Dyslipidemia      Comment: on Zocor. 7/2010: Gastroenteritis      Comment: Hospitalized and given IV fluids and released. No date: Hearing loss in right ear  No date: Hydronephrosis      Comment: bilateral  No date: Impotence of organic origin  No date: Ischemic cardiomyopathy      Comment: Severe, at least symptomatically, appears to                be doing better. No date: Malignant neoplasm of prostate ([address]  No date: Mitral regurgitation      Comment: at one time, he has severe mitral                regurgitation. However, after his SVG to PDA                stent, his mitral regurgitation improved only                mild.  His last echocardiogram of 2012 showed                mild MR.  1/2011: Status post insertion of drug eluting coronary*      Comment: SVG to PDA seq (PLB) stented with 2.75mm                Cypher  08/10/2016: Stroke ([address]    PAST SURGICAL HISTORY:   Past Surgical History:  1992: CABG, ARTERIAL, FOUR+      Comment: LIMA to LAD; SVG to OM1 and OM2; SVG to PDA                and PLB. 1975: HX CHOLECYSTECTOMY  1/2011: HX HEART CATHETERIZATION      Comment: Severe native three vessel disease. LIMA to                LAD; SVG to OM1 and OM2; SVG to PDA and PLB                with JOSE stent. 1970 and 1987: HX HERNIA REPAIR      Comment: Hiatal hernia repair  2004: HX OTHER SURGICAL      Comment: AICD implantation  10/23/09: HX PACEMAKER      Comment: Upgrade to Guidant single-chamber ICD to                [employer] biventricular pacemaker ICD                with atrial lead insertion       SOCIAL HISTORY:  Social History    Marital status:              Spouse name:                       Years of education:                 Number of children:               Social History Main Topics    Smoking status: Never Smoker                                                                Smokeless status: Never Used                        Alcohol use: No              Drug use: No                ALLERGIES:    -- Lisinopril -- Cough     CURRENT MEDICATIONS:   Current Outpatient Prescriptions:  LORazepam (ATIVAN) 0.5 mg tablet, Take 0.5 mg by mouth nightly. carvedilol (COREG) 25 mg tablet, TAKE 1 TABLET TWICE DAILY  Walker misc, Wheeled walker  bumetanide (BUMEX) 1 mg tablet, Take 1 Tab by mouth two (2) times a day. levothyroxine (SYNTHROID) 50 mcg tablet, Take 1 Tab by mouth Daily (before breakfast). spironolactone (ALDACTONE) 25 mg tablet, Take 1 Tab by mouth daily. (Patient taking differently: Take 25 mg by mouth two (2) times a day.)  simvastatin (ZOCOR) 40 mg tablet, Take 1 Tab by mouth nightly.   digoxin (LANOXIN) 0.125 mg tablet, TAKE 1 TABLET EVERY DAY  clopidogrel (PLAVIX) 75 mg tablet, TAKE 1 TABLET EVERY DAY  multivitamin (ONE A DAY) tablet, Take 1 Tab by mouth daily. aspirin 81 mg tablet, Take 81 mg by mouth daily. bacitracin (BACITRACIN) ointment, Apply 500 Units to affected area two (2) times a day. No current facility-administered medications for this visit. REVIEW of SYSTEMS:History obtained from chart review and the patient  General ROS: negative for - weight gain or weight loss  Hematological and Lymphatic ROS: negative for - bleeding problems  Respiratory ROS: Please see history of present illness  Cardiovascular ROS: Please see history of present illness     PHYSICAL EXAMINATION:   /58 (BP 1 Location: Left arm, BP Patient Position: Sitting)  Pulse 71  Ht 5' 7\" (1.702 m)  Wt 62.1 kg (136 lb 12.8 oz)  SpO2 92%  BMI 21.43 kg/m2  BP Readings from Last 3 Encounters:  03/10/17 : 118/58  03/07/17 : 115/51  02/28/17 : 118/52    Pulse Readings from Last 3 Encounters:  03/10/17 : 71  03/07/17 : 69  02/28/17 : 70    Wt Readings from Last 3 Encounters:  03/10/17 : 62.1 kg (136 lb 12.8 oz)  03/07/17 : 61.9 kg (136 lb 6.4 oz)  02/28/17 : 64.4 kg (142 lb)    General: Elderly white male in moderate respiratory distress. Neck: No jugular venous distention. Chest: Decreased breath sounds both bases. Heart: Regular rhythm. 2/6 apical systolic murmur. No diastolic murmur. Extremities: 1+ edema. Skin: Cyanotic. Neurologic: Alert, oriented. No facial asymmetry. Speech within normal limits. Motor deferred. Laboratory data drawn on March 7. Electrolytes within normal limits. BUN and creatinine 41 and 1.78. Digoxin level 2.8. Hematocrit normal    IMPRESSION:   Ischemic cardiomyopathy  Congestive heart failure, decompensated  Exercise-induced respiratory failure. Oxygen saturation dropped to 84 walking a few yards down the grijalva.   A trial of ambulation with 2 L of nasal O2 demonstrated that the patient was able to maintain his oxygen saturation at 96% and his symptoms improved  Renal insufficiency  Digoxin toxicity    PLAN:  I have offered admission to the hospital.  The patient would like to try to manage things at home  The patient qualifies for home O2 on the basis of significant desaturation on exercise  Hold digoxin for 2 days then decrease to 0.125 mg every other day  Change Bumex to 2 mg p.o. every morning  Bumex 2 mg p.o. now      The diagnoses and plan were discussed with patient. All questions answered. Plan of care agreed to by all concerned. Domo Colón MD       ,

## 2017-03-10 NOTE — MR AVS SNAPSHOT
Visit Information Date & Time Provider Department Dept. Phone Encounter #  
 3/10/2017 11:40 AM Clair Mackey MD [facility] [id number] Your Appointments 3/28/2017  1:40 PM  
Follow Up with Felicita Cope MD  
[facility] ([address]) Appt Note: 6 month follow up; 2-3 month f/up [place] [address] [address] [address] [phone] [place]  [phone]  
  
    
 3/28/2017  1:40 PM  
PROCEDURE with Pacer Hv Csi [facility] ([address]) Appt Note: 3 defib check-guidant [facility] [address] [address] [address] [phone] [address] Upcoming Health Maintenance Date Due DTaP/Tdap/Td series (1 - Tdap) 2/18/1949 ZOSTER VACCINE AGE 60> 2/18/1988 GLAUCOMA SCREENING Q2Y 2/18/1993 Pneumococcal 65+ High/Highest Risk (1 of 2 - PCV13) 2/18/1993 MEDICARE YEARLY EXAM 2/18/1993 INFLUENZA AGE 9 TO ADULT 8/1/2016 Allergies as of 3/10/2017  Review Complete On: 3/10/2017 By: Clair Mackey MD  
  
 Severity Noted Reaction Type Reactions Lisinopril  01/23/2017    Cough Current Immunizations  Never Reviewed No immunizations on file. Not reviewed this visit You Were Diagnosed With   
  
 Codes Comments SOB (shortness of breath)    -  Primary ICD-10-CM: R06.02 
ICD-9-CM: 786.05 Fatigue, unspecified type     ICD-10-CM: R53.83 ICD-9-CM: 780.79 Vitals BP Pulse Height(growth percentile) Weight(growth percentile) SpO2 BMI  
 118/58 (BP 1 Location: Left arm, BP Patient Position: Sitting) 71 5' 7\" (1.702 m) 136 lb 12.8 oz (62.1 kg) 92% 21.43 kg/m2 Smoking Status Never Smoker Vitals History BMI and BSA Data Body Mass Index Body Surface Area 21.43 kg/m 2 1.71 m 2 Preferred Pharmacy Pharmacy Name Phone [facility] [address] [place] [phone] [facility] [address] [phone] Your Updated Medication List  
  
   
This list is accurate as of: 3/10/17  1:10 PM.  Always use your most recent med list.  
  
  
  
  
 aspirin 81 mg tablet Take 81 mg by mouth daily. bacitracin ointment Commonly known as:  BACITRACIN Apply 500 Units to affected area two (2) times a day. bumetanide 1 mg tablet Commonly known as:  Vista Jay Take 1 Tab by mouth two (2) times a day. carvedilol 25 mg tablet Commonly known as:  COREG  
TAKE 1 TABLET TWICE DAILY  
  
 clopidogrel 75 mg Tab Commonly known as:  PLAVIX TAKE 1 TABLET EVERY DAY  
  
 digoxin 0.125 mg tablet Commonly known as:  LANOXIN  
TAKE 1 TABLET EVERY DAY  
  
 levothyroxine 50 mcg tablet Commonly known as:  SYNTHROID Take 1 Tab by mouth Daily (before breakfast). LORazepam 0.5 mg tablet Commonly known as:  ATIVAN Take 0.5 mg by mouth nightly. multivitamin tablet Commonly known as:  ONE A DAY Take 1 Tab by mouth daily. simvastatin 40 mg tablet Commonly known as:  ZOCOR Take 1 Tab by mouth nightly. spironolactone 25 mg tablet Commonly known as:  ALDACTONE Take 1 Tab by mouth daily. Sanchez mccurdy We Performed the Following AMB POC EKG ROUTINE W/ 12 LEADS, INTER & REP [67539 CPT(R)] Introducing [facility] SERVICES! [employer] introduces [employer] patient portal. Now you can access parts of your medical record, email your doctor's office, and request medication refills online. 1. In your internet browser, go to [email or web address]. [employer] 2. Click on the First Time User? Click Here link in the Sign In box. You will see the New Member Sign Up page. 3. Enter your Wo[employer] Access Code exactly as it appears below.  You will not need to use this code after youve completed the sign-up process. If you do not sign up before the expiration date, you must request a new code. · [employer] Access Code: [email or web address] Expires: 4/17/2017  9:42 AM 
 
4. Enter the last four digits of your Social Security Number (xxxx) and Date of Birth (mm/dd/yyyy) as indicated and click Submit. You will be taken to the next sign-up page. 5. Create a [employer] ID. This will be your [employer] login ID and cannot be changed, so think of one that is secure and easy to remember. 6. Create a [employer] password. You can change your password at any time. 7. Enter your Password Reset Question and Answer. This can be used at a later time if you forget your password. 8. Enter your e-mail address. You will receive e-mail notification when new information is available in [address] 9. Click Sign Up. You can now view and download portions of your medical record. 10. Click the Download Summary menu link to download a portable copy of your medical information. If you have questions, please visit the Frequently Asked Questions section of the [employer] website. Remember, [employer] is NOT to be used for urgent needs. For medical emergencies, dial 911. Now available from your iPhone and Android! Please provide this summary of care documentation to your next provider. Your primary care clinician is listed as Frederick Baig. If you have any questions after today's visit, please call [phone].

## 2017-03-14 PROBLEM — N28.9 ACUTE RENAL INSUFFICIENCY: Status: ACTIVE | Noted: 2017-01-01

## 2017-03-14 PROBLEM — I50.9 CHF (CONGESTIVE HEART FAILURE) (HCC): Status: ACTIVE | Noted: 2017-01-01

## 2017-03-14 PROBLEM — J18.9 PNEUMONIA: Status: ACTIVE | Noted: 2017-01-01

## 2017-03-14 NOTE — ROUTINE PROCESS
Bedside and Verbal shift change report given to Jona REHMAN Natalie Finley rn (oncoming nurse) by Chasity Angel RN (offgoing nurse). Report included the following information SBAR, Kardex, MAR and Recent Results. SITUATION:    Code Status: DNR   Reason for Admission: Pneumonia   [facility]estive heart failure) [facility]   Acute renal insufficiency    [facility] day: 0   Problem List:       Hospital Problems  Date Reviewed: 2/28/2017          Codes Class Noted POA    CHF (congestive heart failure) ([facility] ICD-10-CM: I50.9  ICD-9-CM: 428.0  3/14/2017 Unknown        Pneumonia ICD-10-CM: J18.9  ICD-9-CM: 453  3/14/2017 Unknown        Acute renal insufficiency ICD-10-CM: N28.9  ICD-9-CM: 593.9  3/14/2017 Unknown              BACKGROUND:    Past Medical History:   Past Medical History:   Diagnosis Date    Aortic insufficiency     stable    Bilateral renal artery stenosis (HCC)     CAD (coronary artery disease)     Appears stable. Status post CABG in 1992, with LIMA to LAD and diagonal branch, sequential SVG to OM1 and OM2, and sequential SVG to distal PCA and PDA.  Cancer [facility])     Prostate, now in remission.  Cardiac catheterization 10/13/2016    Severe native CAD. LAD, Cx, & mRCA 100%. LIMA to LAD patent. SVG to OM1 & OM2 patent. SVG to RPDA and PLB 80% at insert site to RPDA (3 x 18-mm Xience JOSE, resid 0%).  Cardiac echocardiogram abnormal 10/05/2016    Mod LVE. EF 35% (prev 40% on 11/5/15). Severe basal-mid inferior & basal-mid inferolateral hypk. Mild LVH. Gr 3 DDfx. Mild RVE. RVSP 65 mmHg. Severe LAE. Mild ESTELA. Severe MR. Mod AI. Mod-severe TR.  IVCE. AI, MR, PAP have worsened since study of 11/5/15.  Cardiac nuclear imaging test, high risk 10/05/2016    High risk. Lg fixed basal inferior, mid inferior & inferolateral defect related to infarction w/o anirudh-infarct ischemia in RCA. Sm, fixed high anterolateral defect, likely infarction w/o ischemia. Massive LVE.   Mod inferior, inferolateral hypk.  Marked hypk of whole apical complex. EF 27% (prev 35% in study of 12/2014).  Cardiovascular LE venous duplex 11/02/2016    Right leg:  No DVT or venous reflux. Left leg:  Deep venous reflux of mid fem, popliteal AK and BK. Reflux time 2.0 secs.  Cardiovascular renal duplex 05/06/2015    No significant RA stenosis.  Carotid duplex 08/19/2016    Mod 50-69% bilateral ICA stenosis. Probable significant bilateral ECA stenosis. Less than 50% stenosis of both subclavians. Mod calcified plaque in bilateral CCA & bilateral ICA & bulb. No signficant chg from study of 9/10/15.  Dyslipidemia     on Zocor.  Gastroenteritis 7/2010    Hospitalized and given IV fluids and released.  Hearing loss in right ear     Hydronephrosis     bilateral    Impotence of organic origin     Ischemic cardiomyopathy     Severe, at least symptomatically, appears to be doing better.  Malignant neoplasm of prostate ([address]     Mitral regurgitation     at one time, he has severe mitral regurgitation. However, after his SVG to PDA stent, his mitral regurgitation improved only mild. His last echocardiogram of 2012 showed mild MR.     Status post insertion of drug eluting coronary artery stent 1/2011    SVG to PDA seq (PLB) stented with 2.75mm Cypher    Stroke [facility]) 08/10/2016         Patient taking anticoagulants no     ASSESSMENT:    Changes in Assessment Throughout Shift:      Patient has Central Line: no Reasons if yes:    Patient has Sylvester Cath: no Reasons if yes:       Last Vitals:     Vitals:    03/14/17 1200 03/14/17 1500 03/14/17 1527 03/14/17 1822   BP: 109/55 117/58  124/71   Pulse: 71 75  70   Resp: 18 19  18   Temp:   97.8 °F (36.6 °C) 97.6 °F (36.4 °C)   SpO2: (!) 88% 96%  99%   Weight:       Height:            IV and DRAINS (will only show if present)   Peripheral IV 03/14/17 Right Wrist-Site Assessment: Clean, dry, & intact     WOUND (if present)   Wound Type:  none   Dressing present     Wound Concerns/Notes:  none     PAIN    Pain Assessment    Pain Intensity 1: 0 (03/14/17 1709)              Patient Stated Pain Goal: 0  o Interventions for Pain:  none  o Intervention effective:   o Time of last intervention:    o Reassessment Completed: yes      Last 3 Weights:  Last 3 Recorded Weights in this Encounter    03/14/17 1031   Weight: 62.1 kg (137 lb)     Weight change:      INTAKE/OUPUT    Current Shift:      Last three shifts:       LAB RESULTS     Recent Labs      03/14/17   1047   WBC  7.1   HGB  14.3   HCT  43.9   PLT  92*        Recent Labs      03/14/17   1047   NA  135*   K  4.8   GLU  149*   BUN  61*   CREA  2.30*   CA  8.8       RECOMMENDATIONS AND DISCHARGE PLANNING     1. Pending tests/procedures/ Plan of Care or Other Needs:      2. Discharge plan for patient and Needs/Barriers: hospice    3. Estimated Discharge Date: 3/16/17 Posted on Whiteboard in cht Room: yes      4. The patient's care plan was reviewed with the oncoming nurse. \"HEALS\" SAFETY CHECK      Fall Risk    Total Score: 2    Safety Measures:      A safety check occurred in the patient's room between off going nurse and oncoming nurse listed above. The safety check included the below items  Area Items   H  High Alert Medications - Verify all high alert medication drips (heparin, PCA, etc.)   E  Equipment - Suction is set up for ALL patients (with yanker)  - Red plugs utilized for all equipment (IV pumps, etc.)  - WOWs wiped down at end of shift.  - Room stocked with oxygen, suction, and other unit-specific supplies   A  Alarms - Bed alarm is set for fall risk patients  - Ensure chair alarm is in place and activated if patient is up in a chair   L  Lines - Check IV for any infiltration  - Sylvester bag is empty if patient has a Sylvester   - Tubing and IV bags are labeled   S  Safety   - Room is clean, patient is clean, and equipment is clean. - Hallways are clear from equipment besides carts.    - Fall bracelet on for fall risk patients  - Ensure room is clear and free of clutter  - Suction is set up for ALL patients (with maryam)  - Hallways are clear from equipment besides carts.    - Isolation precautions followed, supplies available outside room, sign posted     Mao Coburn RN

## 2017-03-14 NOTE — IP AVS SNAPSHOT
Current Discharge Medication List  
  
ASK your doctor about these medications Dose & Instructions Dispensing Information Comments Morning Noon Evening Bedtime  
 aspirin 81 mg tablet Your last dose was: Your next dose is:    
   
   
 Dose:  81 mg Take 81 mg by mouth daily. Refills:  0  
     
   
   
   
  
 bumetanide 1 mg tablet Commonly known as:  Severiano Bue Your last dose was: Your next dose is:    
   
   
 Dose:  1 mg Take 1 Tab by mouth two (2) times a day. Quantity:  60 Tab Refills:  1  
     
   
   
   
  
 carvedilol 25 mg tablet Commonly known as:  Yoan Buenrostro Your last dose was: Your next dose is: TAKE 1 TABLET TWICE DAILY Quantity:  180 Tab Refills:  3  
     
   
   
   
  
 clopidogrel 75 mg Tab Commonly known as:  PLAVIX Your last dose was: Your next dose is: TAKE 1 TABLET EVERY DAY Quantity:  90 Tab Refills:  3  
     
   
   
   
  
 digoxin 0.125 mg tablet Commonly known as:  LANOXIN Your last dose was: Your next dose is: TAKE 1 TABLET EVERY DAY Quantity:  90 Tab Refills:  3  
     
   
   
   
  
 levothyroxine 50 mcg tablet Commonly known as:  SYNTHROID Your last dose was: Your next dose is:    
   
   
 Dose:  50 mcg Take 1 Tab by mouth Daily (before breakfast). Quantity:  30 Tab Refills:  1 LORazepam 0.5 mg tablet Commonly known as:  ATIVAN Your last dose was: Your next dose is:    
   
   
 Dose:  0.5 mg Take 0.5 mg by mouth nightly. Refills:  0  
     
   
   
   
  
 multivitamin tablet Commonly known as:  ONE A DAY Your last dose was: Your next dose is:    
   
   
 Dose:  1 Tab Take 1 Tab by mouth daily. Refills:  0  
     
   
   
   
  
 simvastatin 40 mg tablet Commonly known as:  ZOCOR Your last dose was: Your next dose is: Dose:  40 mg Take 1 Tab by mouth nightly. Quantity:  30 Tab Refills:  3  
     
   
   
   
  
 spironolactone 25 mg tablet Commonly known as:  ALDACTONE Your last dose was: Your next dose is:    
   
   
 Dose:  25 mg Take 1 Tab by mouth daily. Quantity:  30 Tab Refills:  1 Kahlil Ours Your last dose was: Your next dose is:    
   
   
 Wheeled walker Quantity:  1 Each Refills:  0

## 2017-03-14 NOTE — IP AVS SNAPSHOT
[address] Patient: Rachell Ruiz. MRN: [id number] JUANA:[id number] You are allergic to the following Allergen Reactions Lisinopril Cough Recent Documentation Height Weight BMI Smoking Status 1.689 m 58.6 kg 20.54 kg/m2 Never Smoker Emergency Contacts Name Discharge Info Relation Home Work Mobile Hector Argueta  Child [2] [phone] Hector Argueta  Spouse [3] [phone] About your hospitalization You were admitted on:  March 14, 2017 You last received care in the:  [facility] [address] You were discharged on:  March 16, 2017 Unit phone number:  [phone] Why you were hospitalized Your primary diagnosis was:  Not on File Your diagnoses also included:  Chf (Congestive Heart Failure) (Hcc), Pneumonia, Acute Renal Insufficiency Providers Seen During Your Hospitalizations Provider Role Specialty Primary office phone Lyric Madrid DO Attending Provider Emergency Medicine [phone] Isabell Wisdom MD Attending Provider Internal Medicine [phone] Your Primary Care Physician (PCP) Primary Care Physician Office Phone Office Fax Josie Dyson [phone] Follow-up Information Follow up With Details Comments Contact Info Chato Addison MD On 3/23/2017 @1:15PM [address] [place] [id number] 
[phone] Your Appointments Tuesday March 28, 2017  1:40 PM EDT Follow Up with Bety Dixon MD  
[facility] [place] [id number]  
[phone] Tuesday March 28, 2017  1:40 PM EDT PROCEDURE with Pacer Hv [facility] [place] [id number] [phone] Current Discharge Medication List  
  
ASK your doctor about these medications Dose & Instructions Dispensing Information Comments Morning Noon Evening Bedtime  
 aspirin 81 mg tablet Your last dose was: Your next dose is:    
   
   
 Dose:  81 mg Take 81 mg by mouth daily. Refills:  0  
     
   
   
   
  
 bumetanide 1 mg tablet Commonly known as:  Lesly Galvan Your last dose was: Your next dose is:    
   
   
 Dose:  1 mg Take 1 Tab by mouth two (2) times a day. Quantity:  60 Tab Refills:  1  
     
   
   
   
  
 carvedilol 25 mg tablet Commonly known as:  Cherri Chew Your last dose was: Your next dose is: TAKE 1 TABLET TWICE DAILY Quantity:  180 Tab Refills:  3  
     
   
   
   
  
 clopidogrel 75 mg Tab Commonly known as:  PLAVIX Your last dose was: Your next dose is: TAKE 1 TABLET EVERY DAY Quantity:  90 Tab Refills:  3  
     
   
   
   
  
 digoxin 0.125 mg tablet Commonly known as:  LANOXIN Your last dose was: Your next dose is: TAKE 1 TABLET EVERY DAY Quantity:  90 Tab Refills:  3  
     
   
   
   
  
 levothyroxine 50 mcg tablet Commonly known as:  SYNTHROID Your last dose was: Your next dose is:    
   
   
 Dose:  50 mcg Take 1 Tab by mouth Daily (before breakfast). Quantity:  30 Tab Refills:  1 LORazepam 0.5 mg tablet Commonly known as:  ATIVAN Your last dose was: Your next dose is:    
   
   
 Dose:  0.5 mg Take 0.5 mg by mouth nightly. Refills:  0  
     
   
   
   
  
 multivitamin tablet Commonly known as:  ONE A DAY Your last dose was: Your next dose is:    
   
   
 Dose:  1 Tab Take 1 Tab by mouth daily. Refills:  0  
     
   
   
   
  
 simvastatin 40 mg tablet Commonly known as:  ZOCOR Your last dose was: Your next dose is: Dose:  40 mg Take 1 Tab by mouth nightly. Quantity:  30 Tab Refills:  3  
     
   
   
   
  
 spironolactone 25 mg tablet Commonly known as:  ALDACTONE Your last dose was: Your next dose is:    
   
   
 Dose:  25 mg Take 1 Tab by mouth daily. Quantity:  30 Tab Refills:  1 Reyna Clay Your last dose was: Your next dose is:    
   
   
 Wheeled walker Quantity:  1 Each Refills:  0 Discharge Instructions DISCHARGE SUMMARY from Nurse The following personal items are in your possession at time of discharge: 
 
Dental Appliances: Partials Visual Aid: None Home Medications: None Jewelry: None Clothing: Footwear, Jacket/Coat, Pants, Shirt, Socks, Undergarments Other Valuables: None Personal Items Sent to Safe:  (none) PATIENT INSTRUCTIONS: 
 
After general anesthesia or intravenous sedation, for 24 hours or while taking prescription Narcotics: · Limit your activities · Do not drive and operate hazardous machinery · Do not make important personal or business decisions · Do  not drink alcoholic beverages · If you have not urinated within 8 hours after discharge, please contact your surgeon on call. Report the following to your surgeon: 
· Excessive pain, swelling, redness or odor of or around the surgical area · Temperature over 100.5 · Nausea and vomiting lasting longer than 4 hours or if unable to take medications · Any signs of decreased circulation or nerve impairment to extremity: change in color, persistent  numbness, tingling, coldness or increase pain · Any questions What to do at Home: 
Recommended activity: Activity as tolerated If you experience any of the following symptoms Fever, chills, nausea, vomiting, shortness of breath, or increase in pain please follow up with PCP.  
 
 
*  Please give a list of your current medications to your Primary Care Provider. *  Please update this list whenever your medications are discontinued, doses are 
    changed, or new medications (including over-the-counter products) are added. *  Please carry medication information at all times in case of emergency situations. These are general instructions for a healthy lifestyle: No smoking/ No tobacco products/ Avoid exposure to second hand smoke Surgeon General's Warning:  Quitting smoking now greatly reduces serious risk to your health. Obesity, smoking, and sedentary lifestyle greatly increases your risk for illness A healthy diet, regular physical exercise & weight monitoring are important for maintaining a healthy lifestyle You may be retaining fluid if you have a history of heart failure or if you experience any of the following symptoms:  Weight gain of 3 pounds or more overnight or 5 pounds in a week, increased swelling in our hands or feet or shortness of breath while lying flat in bed. Please call your doctor as soon as you notice any of these symptoms; do not wait until your next office visit. Recognize signs and symptoms of STROKE: 
 
F-face looks uneven A-arms unable to move or move unevenly S-speech slurred or non-existent T-time-call 911 as soon as signs and symptoms begin-DO NOT go Back to bed or wait to see if you get better-TIME IS BRAIN. Warning Signs of HEART ATTACK Call 911 if you have these symptoms: 
? Chest discomfort. Most heart attacks involve discomfort in the center of the chest that lasts more than a few minutes, or that goes away and comes back. It can feel like uncomfortable pressure, squeezing, fullness, or pain. ? Discomfort in other areas of the upper body. Symptoms can include pain or discomfort in one or both arms, the back, neck, jaw, or stomach. ? Shortness of breath with or without chest discomfort. ? Other signs may include breaking out in a cold sweat, nausea, or lightheadedness. Don't wait more than five minutes to call [address]! Fast action can save your life. Calling 911 is almost always the fastest way to get lifesaving treatment. Emergency Medical Services staff can begin treatment when they arrive  up to an hour sooner than if someone gets to the hospital by car. The discharge information has been reviewed with the patient. The patient verbalized understanding. Discharge medications reviewed with the patient and appropriate educational materials and side effects teaching were provided. Patient armband removed and shredded MyChart Activation Thank you for requesting access to Treatsie. Please follow the instructions below to securely access and download your online medical record. Treatsie allows you to send messages to your doctor, view your test results, renew your prescriptions, schedule appointments, and more. How Do I Sign Up? 1. In your internet browser, go to [email or web address] 
2. Click on the First Time User? Click Here link in the Sign In box. You will be redirect to the New Member Sign Up page. 3. Enter your Treatsie Access Code exactly as it appears below. You will not need to use this code after youve completed the sign-up process. If you do not sign up before the expiration date, you must request a new code. Treatsie Access Code: [email or web address] Expires: 2017 10:42 AM (This is the date your Treatsie access code will ) 4. Enter the last four digits of your Social Security Number (xxxx) and Date of Birth (mm/dd/yyyy) as indicated and click Submit. You will be taken to the next sign-up page. 5. Create a Treatsie ID. This will be your Treatsie login ID and cannot be changed, so think of one that is secure and easy to remember. 6. Create a Treatsie password. You can change your password at any time. 7. Enter your Password Reset Question and Answer. This can be used at a later time if you forget your password. 8. Enter your e-mail address. You will receive e-mail notification when new information is available in [address] 9. Click Sign Up. You can now view and download portions of your medical record. 10. Click the Download Summary menu link to download a portable copy of your medical information. Additional Information If you have questions, please visit the Frequently Asked Questions section of the [employer] website at [email or web address] Remember, [employer] is NOT to be used for urgent needs. For medical emergencies, dial 911. Pneumonia: Care Instructions Your Care Instructions Pneumonia is an infection of the lungs. Most cases are caused by infections from bacteria or viruses. Pneumonia may be mild or very severe. If it is caused by bacteria, you will be treated with antibiotics. It may take a few weeks to a few months to recover fully from pneumonia, depending on how sick you were and whether your overall health is good. Follow-up care is a key part of your treatment and safety. Be sure to make and go to all appointments, and call your doctor if you are having problems. Its also a good idea to know your test results and keep a list of the medicines you take. How can you care for yourself at home? · Take your antibiotics exactly as directed. Do not stop taking the medicine just because you are feeling better. You need to take the full course of antibiotics. · Take your medicines exactly as prescribed. Call your doctor if you think you are having a problem with your medicine. · Get plenty of rest and sleep. You may feel weak and tired for a while, but your energy level will improve with time. · To prevent dehydration, drink plenty of fluids, enough so that your urine is light yellow or clear like water. Choose water and other caffeine-free clear liquids until you feel better.  If you have kidney, heart, or liver disease and have to limit fluids, talk with your doctor before you increase the amount of fluids you drink. · Take care of your cough so you can rest. A cough that brings up mucus from your lungs is common with pneumonia. It is one way your body gets rid of the infection. But if coughing keeps you from resting or causes severe fatigue and chest-wall pain, talk to your doctor. He or she may suggest that you take a medicine to reduce the cough. · Use a vaporizer or humidifier to add moisture to your bedroom. Follow the directions for cleaning the machine. · Do not smoke or allow others to smoke around you. Smoke will make your cough last longer. If you need help quitting, talk to your doctor about stop-smoking programs and medicines. These can increase your chances of quitting for good. · Take an over-the-counter pain medicine, such as acetaminophen (Tylenol), ibuprofen (Advil, Motrin), or naproxen (Aleve). Read and follow all instructions on the label. · Do not take two or more pain medicines at the same time unless the doctor told you to. Many pain medicines have acetaminophen, which is Tylenol. Too much acetaminophen (Tylenol) can be harmful. · If you were given a spirometer to measure how well your lungs are working, use it as instructed. This can help your doctor tell how your recovery is going. · To prevent pneumonia in the future, talk to your doctor about getting a flu vaccine (once a year) and a pneumococcal vaccine (one time only for most people). When should you call for help? Call 911 anytime you think you may need emergency care. For example, call if: 
· You have severe trouble breathing. Call your doctor now or seek immediate medical care if: 
· You cough up dark brown or bloody mucus (sputum). · You have new or worse trouble breathing. · You are dizzy or lightheaded, or you feel like you may faint. Watch closely for changes in your health, and be sure to contact your doctor if: 
· You have a new or higher fever. · You are coughing more deeply or more often. · You are not getting better after 2 days (48 hours). · You do not get better as expected. Where can you learn more? Go to http://krista-jacqueline.info/. Enter [id number] in the search box to learn more about \"Pneumonia: Care Instructions. \" Current as of: May 23, 2016 Content Version: 11.1 © 2006-2016 [employer]. Care instructions adapted under license by [employer] (which disclaims liability or warranty for this information). If you have questions about a medical condition or this instruction, always ask your healthcare professional. [place] [address] any warranty or liability for your use of this information. Learning About Self-Care for Heart Failure What is self-care for heart failure? Heart failure usually gets worse over time. But there are many things you can do to feel better, stay healthy longer, and avoid the hospital. 
Self-care means managing your health by doing certain things every day, like weighing yourself. It's about knowing which symptoms to watch for so you can avoid getting worse. When you practice good self-care, you know when it's time to call your doctor and when your heart failure has turned into an emergency. The lists below can help. Top 5 self-care tips for every day 6. Take your medicines as prescribed. This gives them the best chance of helping you. 7. Watch for signs that you're getting worse. Weighing yourself every day is one of the best ways to do this. Weight gain may be a sign that your body is holding on to too much fluid. Weigh yourself at the same time each day, using the same scale, on a hard, flat surface. The best time is in the morning after you go to the bathroom and before you eat or drink anything. 8. Find out what your triggers are, and learn to avoid them. Triggers are things that make your heart failure worse, often suddenly.  A trigger may be eating too much salt, missing a dose of your medicine, or exercising too hard. 9. Limit sodium. This helps keep fluid from building up and makes it easier for your heart to pump. Your doctor may suggest that you limit sodium to 2,000 milligrams (mg) a day or less. That's less than 1 teaspoon. You can stay under this amount by limiting the salt you eat at home and by watching for \"hidden\" sodium when you eat out or shop for food. 10. Try to exercise throughout the week. Exercise makes your heart stronger and can help you avoid symptoms. Walking is a great way to get exercise. If your doctor says it's safe, start out with some short walks, and then slowly build up to longer ones. When should you act? Try to become familiar with signs that mean your heart failure is getting worse. If you need help, talk with your doctor about making a personal plan. Here are some things to watch for as you practice your daily self-care. Call your doctor if: 
· You gain 3 pounds or more over 2 to 3 days. (Or your doctor may tell you how much weight to watch for.) · You have new or worse swelling in your feet, ankles, or legs. · Your breathing gets worse. Activities that did not make you short of breath before are hard for you now. · Your breathing when you lie down is worse than usual, or you wake up at night needing to catch your breath. Be sure to make and go to all of your follow-up appointments. And it's always a good idea to call your doctor anytime you have a sudden change in symptoms. When is it an emergency? Sometimes the symptoms get worse very quickly. This is called sudden heart failure. It causes fluid to build up in your lungs. Sudden heart failure is an emergency. If you have any of these symptoms, you need care right away. Call 911 if: 
· You have severe shortness of breath. · You have an irregular or fast heartbeat. · You cough up foamy, pink mucus. What else can you do to stay healthy? There are other things you can do to take care of your body and your heart. These things will help you feel better. And they will also reduce your risk of heart attack and stroke. · Try to stay at a healthy weight. Eat a healthy diet with lots of fresh fruit, vegetables, and whole grains. · If you smoke, quit. · Limit the amount of alcohol you drink. · Keep high blood pressure and diabetes under control. If you need help, talk with your doctor. If your doctor has not set you up with a cardiac rehabilitation (rehab) program, talk to him or her about whether that is right for you. Cardiac rehab includes exercise, help with diet and lifestyle changes, and emotional support. Also let your doctor know if: 
· You're having trouble sleeping. Sleep is important to your well-being. It also helps your heart work the way it's supposed to. Your doctor can help you decide if you need treatment for sleep problems. · You're feeling sad and hopeless much of the time, or you are worried and anxious. Heart failure can be hard on your emotions. Treatment with counseling and medicine can help. And when you feel better, you're more likely to take care of yourself. Follow-up care is a key part of your treatment and safety. Be sure to make and go to all appointments, and call your doctor if you are having problems. It's also a good idea to know your test results and keep a list of the medicines you take. Where can you learn more? Go to http://krista-jacqueline.info/. Enter A439 in the search box to learn more about \"Learning About Self-Care for Heart Failure. \" Current as of: January 27, 2016 Content Version: 11.1 © 4528-8044 [employer]. Care instructions adapted under license by [employer] (which disclaims liability or warranty for this information).  If you have questions about a medical condition or this instruction, always ask your healthcare professional. [employer] disclaims any warranty or liability for your use of this information. Discharge Orders None Introducing [facility]! Marina Esteban introduces thePlatform patient portal. Now you can access parts of your medical record, email your doctor's office, and request medication refills online. 1. In your internet browser, go to [email or web address]. [email or web address] #[email or web address] 2. Click on the First Time User? Click Here link in the Sign In box. You will see the New Member Sign Up page. 3. Enter your thePlatform Access Code exactly as it appears below. You will not need to use this code after youve completed the sign-up process. If you do not sign up before the expiration date, you must request a new code. · thePlatform Access Code: [email or web address] Expires: 4/17/2017 10:42 AM 
 
4. Enter the last four digits of your Social Security Number (xxxx) and Date of Birth (mm/dd/yyyy) as indicated and click Submit. You will be taken to the next sign-up page. 5. Create a thePlatform ID. This will be your thePlatform login ID and cannot be changed, so think of one that is secure and easy to remember. 6. Create a thePlatform password. You can change your password at any time. 7. Enter your Password Reset Question and Answer. This can be used at a later time if you forget your password. 8. Enter your e-mail address. You will receive e-mail notification when new information is available in [address] 9. Click Sign Up. You can now view and download portions of your medical record. 10. Click the Download Summary menu link to download a portable copy of your medical information. If you have questions, please visit the Frequently Asked Questions section of the thePlatform website. Remember, thePlatform is NOT to be used for urgent needs. For medical emergencies, dial 911. Now available from your iPhone and Android! General Information Please provide this summary of care documentation to your next provider. Patient Signature:  ____________________________________________________________ Date:  ____________________________________________________________  
  
Luis Naas Provider Signature:  ____________________________________________________________ Date:  ____________________________________________________________

## 2017-03-14 NOTE — IP AVS SNAPSHOT
Summary of Care Report The Summary of Care report has been created to help improve care coordination. Users with access to [employer] or [employer] [address] (Web-based application) may access additional patient information including the Discharge Summary. If you are not currently a [address] user and need more information, please call the number listed below in the [place] [address] section and ask to be connected with Medical Records. Facility Information Name Address Phone [address] [place] [address]  [address] [facility] [address] [phone] Patient Information Patient Name Sex  Manjeet Hatfield ([id number]) Male 1928 Referral Details Referred By Referred To Order:  Referral To Palliative Medicine Reason:  Specialty Services Required Order:  Referral To Palliative Medicine Reason:  Specialty Services Required Order:  Referral To Hospice Reason:  Continuity of Care Order:  Referral To Hospice Reason:  Continuity of Care Discharge Information Admitting Provider Service Area Unit Alona Chau MD / [id number] [facility] / [phone] Discharge Provider Discharge Date/Time Discharge Disposition Destination (none) (none) (none) (none) Patient Language Language ENGLISH [13] Problem List as of 3/16/2017  Date Reviewed: 2017 Codes Priority Class Noted - Resolved CAD (coronary artery disease) ICD-10-CM: I25.10 ICD-9-CM: 414.00   Unknown - Present Overview Addendum 10/13/2016  1:17 PM by CHRISTIAN Diggs  
  status post CABG in , with LIMA to LAD and diagonal branch, sequential SVG to OM1 and OM2, and sequential SVG to distal PCA and PDA.    
 
S/p PCI 10/13/16 by Dr. Angelina Greer - SVG to RPDA and RPLB 80% lesion stented to 0% with Xience JOSE. Ischemic cardiomyopathy ICD-10-CM: I25.5 ICD-9-CM: 414.8   Unknown - Present S/P biventricular automatic cardioverter/defibrillator (AICD) upgrade October 2009. ICD-10-CM: Z95.810 ICD-9-CM: V45.02   Unknown - Present RESOLVED: Mitral regurgitation ICD-10-CM: I34.0 ICD-9-CM: 424.0   Unknown - 2/1/2011 Overview Signed 10/27/2010 12:29 PM by Denae Barnes  
  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 Dyslipidemia ICD-10-CM: E78.5 ICD-9-CM: 272.4   2/19/2013 - Present Essential hypertension, benign ICD-10-CM: I10 
ICD-9-CM: 401.1   2/19/2013 - Present  
 TIA (transient ischemic attack) ICD-10-CM: G45.9 ICD-9-CM: 435.9   8/16/2016 - Present Atrial fibrillation [facility]) ICD-10-CM: I48.91 
ICD-9-CM: 427.31   12/18/2016 - Present Thrombocytopenia ([facility] ICD-10-CM: D69.6 ICD-9-CM: 287.5   12/18/2016 - Present Elevated troponin ICD-10-CM: R79.89 ICD-9-CM: 790.6   12/18/2016 - Present HAN (acute kidney injury) ([address] ICD-10-CM: N17.9 ICD-9-CM: 584.9   12/18/2016 - Present Acute on chronic congestive heart failure (HCC) ICD-10-CM: I50.9 ICD-9-CM: 428.0   12/18/2016 - Present Acute exacerbation of CHF (congestive heart failure) (HCC) ICD-10-CM: I50.9 ICD-9-CM: 428.0   12/18/2016 - Present CHF (congestive heart failure) (HCC) ICD-10-CM: I50.9 ICD-9-CM: 428.0   3/14/2017 - Present Pneumonia ICD-10-CM: J18.9 ICD-9-CM: 611   3/14/2017 - Present Acute renal insufficiency ICD-10-CM: N28.9 ICD-9-CM: 593.9   3/14/2017 - Present You are allergic to the following Allergen Reactions Lisinopril Cough Current Discharge Medication List  
  
ASK your doctor about these medications Dose & Instructions Dispensing Information Comments  
 aspirin 81 mg tablet Dose:  81 mg Take 81 mg by mouth daily. Refills:  0  
   
 bumetanide 1 mg tablet Commonly known as:  Lesly Galvan Dose:  1 mg Take 1 Tab by mouth two (2) times a day. Quantity:  60 Tab Refills:  1  
   
 carvedilol 25 mg tablet Commonly known as:  COREG  
 TAKE 1 TABLET TWICE DAILY Quantity:  180 Tab Refills:  3  
   
 clopidogrel 75 mg Tab Commonly known as:  PLAVIX TAKE 1 TABLET EVERY DAY Quantity:  90 Tab Refills:  3  
   
 digoxin 0.125 mg tablet Commonly known as:  LANOXIN  
 TAKE 1 TABLET EVERY DAY Quantity:  90 Tab Refills:  3  
   
 levothyroxine 50 mcg tablet Commonly known as:  SYNTHROID Dose:  50 mcg Take 1 Tab by mouth Daily (before breakfast). Quantity:  30 Tab Refills:  1 LORazepam 0.5 mg tablet Commonly known as:  ATIVAN Dose:  0.5 mg Take 0.5 mg by mouth nightly. Refills:  0  
   
 multivitamin tablet Commonly known as:  ONE A DAY Dose:  1 Tab Take 1 Tab by mouth daily. Refills:  0  
   
 simvastatin 40 mg tablet Commonly known as:  ZOCOR Dose:  40 mg Take 1 Tab by mouth nightly. Quantity:  30 Tab Refills:  3  
   
 spironolactone 25 mg tablet Commonly known as:  ALDACTONE Dose:  25 mg Take 1 Tab by mouth daily. Quantity:  30 Tab Refills:  1 Rise Preston Wheeled walker Quantity:  1 Each Refills:  0 Follow-up Information Follow up With Details Comments Contact Info Loren Rodríguez MD On 3/23/2017 @1:15PM [address] [place] [address] [address] [address] 
[phone] Discharge Instructions DISCHARGE SUMMARY from Nurse The following personal items are in your possession at time of discharge: 
 
Dental Appliances: Partials Visual Aid: None Home Medications: None Jewelry: None Clothing: Footwear, Jacket/Coat, Pants, Shirt, Socks, Undergarments Other Valuables: None Personal Items Sent to Safe:  (none) PATIENT INSTRUCTIONS: 
 
 
F-face looks uneven A-arms unable to move or move unevenly S-speech slurred or non-existent T-time-call 911 as soon as signs and symptoms begin-DO NOT go Back to bed or wait to see if you get better-TIME IS BRAIN. Warning Signs of HEART ATTACK Call 911 if you have these symptoms: 
? Chest discomfort. Most heart attacks involve discomfort in the center of the chest that lasts more than a few minutes, or that goes away and comes back. It can feel like uncomfortable pressure, squeezing, fullness, or pain. ? Discomfort in other areas of the upper body. Symptoms can include pain or discomfort in one or both arms, the back, neck, jaw, or stomach. ? Shortness of breath with or without chest discomfort. ? Other signs may include breaking out in a cold sweat, nausea, or lightheadedness. Don't wait more than five minutes to call [address]! Fast action can save your life. Calling 911 is almost always the fastest way to get lifesaving treatment. Emergency Medical Services staff can begin treatment when they arrive  up to an hour sooner than if someone gets to the hospital by car. The discharge information has been reviewed with the patient.   The patient verbalized understanding. Discharge medications reviewed with the patient and appropriate educational materials and side effects teaching were provided. Patient armband removed and shredded MyChart Activation Thank you for requesting access to [employer]. Please follow the instructions below to securely access and download your online medical record. [employer] allows you to send messages to your doctor, view your test results, renew your prescriptions, schedule appointments, and more. How Do I Sign Up? 1. In your internet browser, go to [email or web address] 
2. Click on the First Time User? Click Here link in the Sign In box. You will be redirect to the New Member Sign Up page. 3. Enter your [employer] Access Code exactly as it appears below. You will not need to use this code after youve completed the sign-up process. If you do not sign up before the expiration date, you must request a new code. [employer] Access Code: [email or web address] Expires: 2017 10:42 AM (This is the date your [employer] access code will ) 4. Enter the last four digits of your Social Security Number (xxxx) and Date of Birth (mm/dd/yyyy) as indicated and click Submit. You will be taken to the next sign-up page. 5. Create a [employer] ID. This will be your [employer] login ID and cannot be changed, so think of one that is secure and easy to remember. 6. Create a [employer] password. You can change your password at any time. 7. Enter your Password Reset Question and Answer. This can be used at a later time if you forget your password. 8. Enter your e-mail address. You will receive e-mail notification when new information is available in [address]. 9. Click Sign Up. You can now view and download portions of your medical record. 10. Click the Download Summary menu link to download a portable copy of your medical information. Additional Information If you have questions, please visit the Frequently Asked Questions section of the [employer] website at [email or web address] Remember, [employer] is NOT to be used for urgent needs. For medical emergencies, dial 911. Pneumonia: Care Instructions Your Care Instructions Pneumonia is an infection of the lungs. Most cases are caused by infections from bacteria or viruses. Pneumonia may be mild or very severe. If it is caused by bacteria, you will be treated with antibiotics. It may take a few weeks to a few months to recover fully from pneumonia, depending on how sick you were and whether your overall health is good. Follow-up care is a key part of your treatment and safety. Be sure to make and go to all appointments, and call your doctor if you are having problems. Its also a good idea to know your test results and keep a list of the medicines you take. How can you care for yourself at home? · Take your antibiotics exactly as directed. Do not stop taking the medicine just because you are feeling better. You need to take the full course of antibiotics. · Take your medicines exactly as prescribed. Call your doctor if you think you are having a problem with your medicine. · Get plenty of rest and sleep. You may feel weak and tired for a while, but your energy level will improve with time. · To prevent dehydration, drink plenty of fluids, enough so that your urine is light yellow or clear like water. Choose water and other caffeine-free clear liquids until you feel better. If you have kidney, heart, or liver disease and have to limit fluids, talk with your doctor before you increase the amount of fluids you drink. · Take care of your cough so you can rest. A cough that brings up mucus from your lungs is common with pneumonia. It is one way your body gets rid of the infection. But if coughing keeps you from resting or causes severe fatigue and chest-wall pain, talk to your doctor. He or she may suggest that you take a medicine to reduce the cough. · Use a vaporizer or humidifier to add moisture to your bedroom. Follow the directions for cleaning the machine. · Do not smoke or allow others to smoke around you. Smoke will make your cough last longer. If you need help quitting, talk to your doctor about stop-smoking programs and medicines. These can increase your chances of quitting for good. · Take an over-the-counter pain medicine, such as acetaminophen (Tylenol), ibuprofen (Advil, Motrin), or naproxen (Aleve). Read and follow all instructions on the label. · Do not take two or more pain medicines at the same time unless the doctor told you to. Many pain medicines have acetaminophen, which is Tylenol. Too much acetaminophen (Tylenol) can be harmful. · If you were given a spirometer to measure how well your lungs are working, use it as instructed. This can help your doctor tell how your recovery is going. · To prevent pneumonia in the future, talk to your doctor about getting a flu vaccine (once a year) and a pneumococcal vaccine (one time only for most people). When should you call for help? Call 911 anytime you think you may need emergency care. For example, call if: 
· You have severe trouble breathing. Call your doctor now or seek immediate medical care if: 
· You cough up dark brown or bloody mucus (sputum). · You have new or worse trouble breathing. · You are dizzy or lightheaded, or you feel like you may faint. Watch closely for changes in your health, and be sure to contact your doctor if: 
· You have a new or higher fever. · You are coughing more deeply or more often. · You are not getting better after 2 days (48 hours). · You do not get better as expected. Where can you learn more? Go to http://krista-jacqueline.info/. Enter [id number] in the search box to learn more about \"Pneumonia: Care Instructions. \" Current as of: May 23, 2016 Content Version: 11.1 © 9176-7424 [employer].  Care instructions adapted under license by [address] (which disclaims liability or warranty for this information). If you have questions about a medical condition or this instruction, always ask your healthcare professional. [address] any warranty or liability for your use of this information. Learning About Self-Care for Heart Failure What is self-care for heart failure? Heart failure usually gets worse over time. But there are many things you can do to feel better, stay healthy longer, and avoid the hospital. 
Self-care means managing your health by doing certain things every day, like weighing yourself. It's about knowing which symptoms to watch for so you can avoid getting worse. When you practice good self-care, you know when it's time to call your doctor and when your heart failure has turned into an emergency. The lists below can help. Top 5 self-care tips for every day 6. Take your medicines as prescribed. This gives them the best chance of helping you. 7. Watch for signs that you're getting worse. Weighing yourself every day is one of the best ways to do this. Weight gain may be a sign that your body is holding on to too much fluid. Weigh yourself at the same time each day, using the same scale, on a hard, flat surface. The best time is in the morning after you go to the bathroom and before you eat or drink anything. 8. Find out what your triggers are, and learn to avoid them. Triggers are things that make your heart failure worse, often suddenly. A trigger may be eating too much salt, missing a dose of your medicine, or exercising too hard. 9. Limit sodium. This helps keep fluid from building up and makes it easier for your heart to pump. Your doctor may suggest that you limit sodium to 2,000 milligrams (mg) a day or less. That's less than 1 teaspoon.  You can stay under this amount by limiting the salt you eat at home and by watching for \"hidden\" sodium when you eat out or shop for food. 
10. Try to exercise throughout the week. Exercise makes your heart stronger and can help you avoid symptoms. Walking is a great way to get exercise. If your doctor says it's safe, start out with some short walks, and then slowly build up to longer ones. When should you act? Try to become familiar with signs that mean your heart failure is getting worse. If you need help, talk with your doctor about making a personal plan. Here are some things to watch for as you practice your daily self-care. Call your doctor if: 
· You gain 3 pounds or more over 2 to 3 days. (Or your doctor may tell you how much weight to watch for.) · You have new or worse swelling in your feet, ankles, or legs. · Your breathing gets worse. Activities that did not make you short of breath before are hard for you now. · Your breathing when you lie down is worse than usual, or you wake up at night needing to catch your breath. Be sure to make and go to all of your follow-up appointments. And it's always a good idea to call your doctor anytime you have a sudden change in symptoms. When is it an emergency? Sometimes the symptoms get worse very quickly. This is called sudden heart failure. It causes fluid to build up in your lungs. Sudden heart failure is an emergency. If you have any of these symptoms, you need care right away. Call 911 if: 
· You have severe shortness of breath. · You have an irregular or fast heartbeat. · You cough up foamy, pink mucus. What else can you do to stay healthy? There are other things you can do to take care of your body and your heart. These things will help you feel better. And they will also reduce your risk of heart attack and stroke. · Try to stay at a healthy weight. Eat a healthy diet with lots of fresh fruit, vegetables, and whole grains. · If you smoke, quit. · Limit the amount of alcohol you drink. · Keep high blood pressure and diabetes under control.  If you need help, talk with your doctor. If your doctor has not set you up with a cardiac rehabilitation (rehab) program, talk to him or her about whether that is right for you. Cardiac rehab includes exercise, help with diet and lifestyle changes, and emotional support. Also let your doctor know if: 
· You're having trouble sleeping. Sleep is important to your well-being. It also helps your heart work the way it's supposed to. Your doctor can help you decide if you need treatment for sleep problems. · You're feeling sad and hopeless much of the time, or you are worried and anxious. Heart failure can be hard on your emotions. Treatment with counseling and medicine can help. And when you feel better, you're more likely to take care of yourself. Follow-up care is a key part of your treatment and safety. Be sure to make and go to all appointments, and call your doctor if you are having problems. It's also a good idea to know your test results and keep a list of the medicines you take. Where can you learn more? Go to http://krista-jacqueline.info/. Enter N992 in the search box to learn more about \"Learning About Self-Care for Heart Failure. \" Current as of: January 27, 2016 Content Version: 11.1 © 9663-1528 [employer]. Care instructions adapted under license by [employer] (which disclaims liability or warranty for this information). If you have questions about a medical condition or this instruction, always ask your healthcare professional. [place] [address] any warranty or liability for your use of this information. Chart Review Routing History Recipient Method Report Sent By Cirilo Jones MD  
Fax: [phone] Phone: [phone] Fax Provider Comm Report Sharla Hobbs [[id number]] 8/5/2011  6:57 AM 08/02/2011 Kemar Yanes RN Phone: [phone] In [place] [address] CUSTOM LAB REPORT Corinne Chavez [[id number]] 9/6/2011 10:20 AM 09/02/2011 Shilpi Montilla MD  
Fax: [phone] Phone: [phone] Fax Provider Comm Report Yaneth Hayes [[id number]] 2/8/2012 11:25 AM 02/07/2012 Zac My Laws MD  
Phone: [phone] In [facility] [place] IP AMB RESULT REPORT IMAGING Sobeida Nadia White [[id number]] 2/23/2012  4:22 PM 02/23/2012 394Kinga My Laws MD  
Phone: [phone] In [facility] [place] IP AMB RESULT REPORT IMAGING Sobeida Nadia White [[id number]] 2/23/2012  4:23 PM 02/21/2012 Shilpi Montilla MD  
Fax: [phone] Phone: [phone] Fax Provider Comm Report Karma Hernandez [[id number]] 5/15/2012  3:53 PM 05/15/2012 Shilpi Montilla MD  
Fax: [phone] Phone: [phone] Fax Provider Comm Report Yaneth Hayes [[id number]] 8/22/2012  9:09 AM 08/21/2012 Shilpi Montilla MD  
Fax: [phone] Phone: [phone] Fax Note Review Yaneth Hayes [[id number]] 2/21/2013  1:26 PM 02/19/2013 Luly Fraga MD  
Fax: [phone] Phone: [phone] Fax IP Auto Routed [employer], [address] [place] [address] [address] 10/6/2016  7:56 AM 10/06/2016 Luly Fraga MD  
Fax: [phone] Phone: [phone] Fax IP Auto Routed 1004 Raz Escalera MD [[id number]] 10/13/2016  3:43 PM 10/13/2016 Jaylyn Ratliff DO Phone: [phone] In [facility] IP Auto Routed Trans Edd Oppenheim, MD [[id number]] 12/27/2016  1:09 PM 12/27/2016 Julio Garcia MD  
Fax: [phone] Phone: [phone] Fax IP Auto Routed Trans Edd Oppenheim, MD [[id number]] 12/27/2016  1:09 PM 12/27/2016 Cecilio Curry MD  
Fax: [phone] Phone: [phone] Fax IP Auto Routed Brenda Sun MD [[id number]] 1/21/2017  6:32 AM 01/21/2017 3947 My Laws MD  
Phone: [phone] In Basket IP Auto Routed Brenda Sun MD [[id number]] 1/21/2017  6:32 AM 01/21/2017

## 2017-03-14 NOTE — TELEPHONE ENCOUNTER
[employer] referral received. Referral has been sent out to the team. Yissel Geller has followed up on this referral today. She met with the patient and his 2 daughters. She discussed the patient with Dr. Hattie Heard, and the patient is appropriate for Home Hospice. Can admit the patient to Hospice home from hospital.  Please contact [phone] with any questions or concerns regarding this patient. Thank you for referring to [employer].

## 2017-03-14 NOTE — HOSPICE
[facility]osp[facility] RN Shahram Fountain met with pt and family, in collaboration with Dr Shin John. Pt appropriate for hospice admit at home. Hospice will follow for plan and admit pt to hospice once he is home. Thank you for the referral.  Please contact [phone] with any questions or concerns regarding this patient.

## 2017-03-14 NOTE — ED PROVIDER NOTES
HPI Comments: 10:29 AM Kavin Adorno is a 80 y.o. male with a history of TIA, CAD and CHF who presents to the ED for SOB secondary to end stage heart failure onset Wednesday. Pt daughter in room states pt has been progressively worsening since Wednesday. Daughter claims they have been \"trying to manage his CHF at home and avoid coming to the ER\". Daughter called Dr. Rory Morocho, cardiologist, advised them to go to ED. Pt has also been experiencing a dry cough and BLE swelling. Was admitted in December for ~ 9 days for similar sxs. Pt is on O2 liters at home all the time. Pt appetite is poor. Denying N/VD. No other acute concerns. PCP: Maryann Randolph MD      The history is provided by the patient. Past Medical History:   Diagnosis Date    Aortic insufficiency     stable    Bilateral renal artery stenosis (HCC)     CAD (coronary artery disease)     Appears stable. Status post CABG in 1992, with LIMA to LAD and diagonal branch, sequential SVG to OM1 and OM2, and sequential SVG to distal PCA and PDA.  Cancer [facility])     Prostate, now in remission.  Cardiac catheterization 10/13/2016    Severe native CAD. LAD, Cx, & mRCA 100%. LIMA to LAD patent. SVG to OM1 & OM2 patent. SVG to RPDA and PLB 80% at insert site to RPDA (3 x 18-mm Xience JOSE, resid 0%).  Cardiac echocardiogram abnormal 10/05/2016    Mod LVE. EF 35% (prev 40% on 11/5/15). Severe basal-mid inferior & basal-mid inferolateral hypk. Mild LVH. Gr 3 DDfx. Mild RVE. RVSP 65 mmHg. Severe LAE. Mild ESTELA. Severe MR. Mod AI. Mod-severe TR.  IVCE. AI, MR, PAP have worsened since study of 11/5/15.  Cardiac nuclear imaging test, high risk 10/05/2016    High risk. Lg fixed basal inferior, mid inferior & inferolateral defect related to infarction w/o anirudh-infarct ischemia in RCA. Sm, fixed high anterolateral defect, likely infarction w/o ischemia. Massive LVE. Mod inferior, inferolateral hypk.   Marked hypk of whole apical complex. EF 27% (prev 35% in study of 12/2014).  Cardiovascular LE venous duplex 11/02/2016    Right leg:  No DVT or venous reflux. Left leg:  Deep venous reflux of mid fem, popliteal AK and BK. Reflux time 2.0 secs.  Cardiovascular renal duplex 05/06/2015    No significant RA stenosis.  Carotid duplex 08/19/2016    Mod 50-69% bilateral ICA stenosis. Probable significant bilateral ECA stenosis. Less than 50% stenosis of both subclavians. Mod calcified plaque in bilateral CCA & bilateral ICA & bulb. No signficant chg from study of 9/10/15.  Dyslipidemia     on Zocor.  Gastroenteritis 7/2010    Hospitalized and given IV fluids and released.  Hearing loss in right ear     Hydronephrosis     bilateral    Impotence of organic origin     Ischemic cardiomyopathy     Severe, at least symptomatically, appears to be doing better.  Malignant neoplasm of prostate ([address]     Mitral regurgitation     at one time, he has severe mitral regurgitation. However, after his SVG to PDA stent, his mitral regurgitation improved only mild. His last echocardiogram of 2012 showed mild MR.  Status post insertion of drug eluting coronary artery stent 1/2011    SVG to PDA seq (PLB) stented with 2.75mm Cypher    Stroke [facility]) 08/10/2016       Past Surgical History:   Procedure Laterality Date    CABG, ARTERIAL, FOUR+  1992    LIMA to LAD; SVG to OM1 and OM2; SVG to PDA and PLB.  HX CHOLECYSTECTOMY  1975    HX HEART CATHETERIZATION  1/2011    Severe native three vessel disease. LIMA to LAD; SVG to OM1 and OM2; SVG to PDA and PLB with JOSE stent.     HX HERNIA REPAIR  1970 and 1987    Hiatal hernia repair    HX OTHER SURGICAL  2004    AICD implantation    HX PACEMAKER  10/23/09    Upgrade to Guidant single-chamber ICD to [address] 233 biventricular pacemaker ICD with atrial lead insertion          Family History:   Problem Relation Age of Onset    Heart Disease Mother     Heart Failure Father     Other Brother 76     heart transplant, later        Social History     Social History    Marital status:      Spouse name: N/A    Number of children: N/A    Years of education: N/A     Occupational History    Not on file. Social History Main Topics    Smoking status: Never Smoker    Smokeless tobacco: Never Used    Alcohol use No    Drug use: No    Sexual activity: Not on file     Other Topics Concern    Not on file     Social History Narrative         ALLERGIES: Lisinopril    Review of Systems   Constitutional: Negative for chills, fatigue and fever. HENT: Negative. Negative for sore throat. Eyes: Negative. Respiratory: Positive for cough and shortness of breath. Cardiovascular: Negative for chest pain and palpitations. Gastrointestinal: Negative for abdominal pain, diarrhea, nausea and vomiting. Genitourinary: Negative for dysuria. Musculoskeletal: Negative. (+) BLE swelling   Skin: Negative. Neurological: Negative for dizziness, weakness, light-headedness and headaches. Psychiatric/Behavioral: Negative. All other systems reviewed and are negative. There were no vitals filed for this visit. Physical Exam   Constitutional: He appears well-developed and well-nourished. Non-toxic appearance. He does not have a sickly appearance. He does not appear ill. No distress. HENT:   Head: Normocephalic and atraumatic. Mouth/Throat: Oropharynx is clear and moist. No oropharyngeal exudate. Eyes: Conjunctivae and EOM are normal. Pupils are equal, round, and reactive to light. No scleral icterus. Neck: Trachea normal and normal range of motion. Neck supple. No hepatojugular reflux and no JVD present. No tracheal deviation present. No thyromegaly present. Cardiovascular: Normal rate, regular rhythm, S1 normal, S2 normal, normal heart sounds, intact distal pulses and normal pulses. Exam reveals no gallop, no S3 and no S4.     No murmur heard.  Pulses:       Radial pulses are 2+ on the right side, and 2+ on the left side. Dorsalis pedis pulses are 2+ on the right side, and 2+ on the left side. Pulmonary/Chest: Accessory muscle usage present. No respiratory distress. He has no decreased breath sounds. He has no wheezes. He has rhonchi in the right lower field and the left lower field. He has no rales. Abdominal: Soft. Normal appearance and bowel sounds are normal. He exhibits no distension and no mass. There is no hepatosplenomegaly. There is no tenderness. There is no rigidity, no rebound, no guarding, no CVA tenderness, no tenderness at McBurney's point and negative Rebolledo's sign. Musculoskeletal: Normal range of motion. Strength 3/5 throughout    Lymphadenopathy:        Head (right side): No submental, no submandibular, no preauricular and no occipital adenopathy present. Head (left side): No submental, no submandibular, no preauricular and no occipital adenopathy present. He has no cervical adenopathy. Right: No supraclavicular adenopathy present. Left: No supraclavicular adenopathy present. Neurological: He is alert. He has normal strength and normal reflexes. He is not disoriented. No cranial nerve deficit or sensory deficit. Coordination and gait normal. GCS eye subscore is 4. GCS verbal subscore is 5. GCS motor subscore is 6. Grossly intact    Skin: Skin is warm, dry and intact. No rash noted. He is not diaphoretic. Psychiatric: He has a normal mood and affect. His speech is normal and behavior is normal. Judgment and thought content normal. Cognition and memory are normal.   Nursing note and vitals reviewed.        MDM  Number of Diagnoses or Management Options  Diagnosis management comments: Shortness of breath etiologies include chronic obstructive pulmonary disease (COPD), acute asthma exacerbation, congestive heart failure, pneumonia, acute bronchitis, pulmonary embolism, upper respiratory infection, cardiac event to include acute coronary syndrome, acute myocardial infarction or a combination of the above (ex URI on top of COPD thus causing respiratory distress). ED Course       Procedures      Vitals:  Patient Vitals for the past 12 hrs:   Temp Pulse Resp BP SpO2   03/14/17 1031 97.9 °F (36.6 °C) 70 14 114/52 92 %     10:43 AM Spoke with Chelsy Bruners from palliative care to see if they could see patient either in the ED or at home was unsure at this time and will call back to see the best option for him. Advised to call them at [phone] if I do not hear anything back soon. Consult:  Discussed care with Dr. Olga Saravia, Hospitalist; Standard discussion; including history of patients chief complaint, available diagnostic results, and treatment course. Agrees to the plan of admission and accepts pt. Will talk to [facility] hospice to further evaluate. Will call back if anything changes. Labs essentially normal with the exception of platelets at 92, elevated BUN and Creatine, and cardiac enzymes negative. Chest X-Ray showed 1. Pacemaker in place. 2. Left-sided pleural effusion with associated atelectatic changes. Developing left lower lobe/retrocardiac region infiltrate cannot be excluded however. EKG showed a paced rate at 73 bpm with no ST elevations or depression   11:32 AM 3/14/2017    Scribe Attestation:   I, Lizzie Post, scribing for and in the presence of Cyd Ganser, DO March 14, 2017   Signed by: Tiffani Shukla, March 14, 2017 at 10:42 AM     Physician Attestation:   I personally performed the services described in this documentation, reviewed and edited the documentation which was dictated to the scribe in my presence, and it accurately records my words and actions.  Cyd Ganser, DO  March 14, 2017

## 2017-03-15 NOTE — PROGRESS NOTES
[facility] Hospitalist Group  Progress Note    Patient: Fadi Raygoza. Age: 80 y.o. : 1928 MR#: [id number] SSN: [id number]  Date/Time: 3/15/2017     Subjective: pt feels fine, c/o dryness and itching in left eye, no discharge or watering       Assessment/Plan:   1. Acute-on-chronic systolic congestive heart failure: cont Bumex and Coreg. Cardiology eval pending. S/p deactivate of AICD. 2. Hx TIA. Continue statin and aspirin. 3. CAD. Continue aspirin, beta blocker, statin. 4. CKD, stage III, creatinine back to baseline, will monitor closely. 5. Hypoxia. Cont O2  6. Chronic respiratory failure on 2 L nasal cannula, 18 hours daily. 7. Elevated liver function tests in the setting of CHF, hepatic congestion. 8. Hypothyroidism. Continue Synthroid  9. Left eye redness: allergy, start naphazoline   DNR  D/w pt and daughter at bedside in detail, pt very clear wants hospice at home. Family meeting with hospice in am. Pt requets no blood draws.       I spent 30 minutes with the patient in face-to-face consultation, of which greater than 50% was spent in counseling and coordination of care as described above    Case discussed with:  [x]Patient  [x]Family  [x]Nursing  [x]Case Management  DVT Prophylaxis:  []Lovenox  [x]Hep SQ  []SCDs  []Coumadin   []On Heparin gtt    Objective:   VS:   Visit Vitals    /63 (BP 1 Location: Left arm, BP Patient Position: Head of bed elevated (Comment degrees))    Pulse 70    Temp 96.8 °F (36 °C)    Resp 18    Ht 5' 6.5\" (1.689 m)    Wt 62.1 kg (137 lb)    SpO2 94%    BMI 21.78 kg/m2      Tmax/24hrs: Temp (24hrs), Av.5 °F (36.4 °C), Min:96.8 °F (36 °C), Max:98 °F (36.7 °C)  IOBRIEF  Intake/Output Summary (Last 24 hours) at 03/15/17 1238  Last data filed at 17 2238   Gross per 24 hour   Intake              120 ml   Output                0 ml   Net              120 ml       General:  Alert, cooperative, no acute distress    HEENT: left medial eye redness. PERRLA, anicteric sclerae. Pulmonary:  CTA Bilaterally. Cardiovascular: Regular rate and Rhythm. GI:  Soft, Non distended, Non tender. + Bowel sounds. Extremities:  No edema, cyanosis, clubbing. No calf tenderness. Psych: Good insight. Not anxious or agitated. Neurologic: Alert and oriented X 3. No acute neuro deficits. Additional:    Medications:   Current Facility-Administered Medications   Medication Dose Route Frequency    naphazoline-pheniramine (NAPHCON A) 0.025-0.3 % ophthalmic solution drop 2 Drop  2 Drop Left Eye QID PRN    aspirin delayed-release tablet 81 mg  81 mg Oral DAILY    therapeutic multivitamin (THERAGRAN) tablet 1 Tab  1 Tab Oral DAILY    clopidogrel (PLAVIX) tablet 75 mg  75 mg Oral DAILY    simvastatin (ZOCOR) tablet 40 mg  40 mg Oral QHS    bumetanide (BUMEX) tablet 1 mg  1 mg Oral BID    levothyroxine (SYNTHROID) tablet 50 mcg  50 mcg Oral ACB    LORazepam (ATIVAN) tablet 0.5 mg  0.5 mg Oral QHS    carvedilol (COREG) tablet 3.125 mg  3.125 mg Oral BID WITH MEALS    acetaminophen (TYLENOL) tablet 500 mg  500 mg Oral Q6H PRN    ondansetron (ZOFRAN) injection 4 mg  4 mg IntraVENous Q8H PRN    heparin (porcine) injection 5,000 Units  5,000 Units SubCUTAneous Q8H    polyvinyl alcohol (LIQUIFILM TEARS) 1.4 % ophthalmic solution 1 Drop  1 Drop Left Eye PRN       Labs:    Recent Results (from the past 24 hour(s))   CBC WITH AUTOMATED DIFF    Collection Time: 03/15/17  3:33 AM   Result Value Ref Range    WBC 5.8 4.6 - 13.2 K/uL    RBC 3.72 (L) 4.70 - 5.50 M/uL    HGB 13.3 13.0 - 16.0 g/dL    HCT 40.6 36.0 - 48.0 %    .1 (H) 74.0 - 97.0 FL    MCH 35.8 (H) 24.0 - 34.0 PG    MCHC 32.8 31.0 - 37.0 g/dL    RDW 18.0 (H) 11.6 - 14.5 %    PLATELET 115 (L) 799 - 420 K/uL    MPV 11.2 9.2 - 11.8 FL    NEUTROPHILS 63 40 - 73 %    LYMPHOCYTES 14 (L) 21 - 52 %    MONOCYTES 22 (H) 3 - 10 %    EOSINOPHILS 1 0 - 5 %    BASOPHILS 0 0 - 2 %    ABS.  NEUTROPHILS 3.7 1.8 - 8.0 K/UL    ABS. LYMPHOCYTES 0.8 (L) 0.9 - 3.6 K/UL    ABS. MONOCYTES 1.3 (H) 0.05 - 1.2 K/UL    ABS. EOSINOPHILS 0.0 0.0 - 0.4 K/UL    ABS.  BASOPHILS 0.0 0.0 - 0.1 K/UL    DF AUTOMATED     METABOLIC PANEL, BASIC    Collection Time: 03/15/17  3:33 AM   Result Value Ref Range    Sodium 138 136 - 145 mmol/L    Potassium 3.5 3.5 - 5.5 mmol/L    Chloride 100 100 - 108 mmol/L    CO2 27 21 - 32 mmol/L    Anion gap 11 3.0 - 18 mmol/L    Glucose 81 74 - 99 mg/dL    BUN 52 (H) 7.0 - 18 MG/DL    Creatinine 1.90 (H) 0.6 - 1.3 MG/DL    BUN/Creatinine ratio 27 (H) 12 - 20      GFR est AA 41 (L) >60 ml/min/1.73m2    GFR est non-AA 34 (L) >60 ml/min/1.73m2    Calcium 8.7 8.5 - 10.1 MG/DL   DIGOXIN    Collection Time: 03/15/17  3:33 AM   Result Value Ref Range    DIGOXIN 1.5 0.9 - 2.0 NG/ML       Signed By: Sylvester Pandya MD     March 15, 2017

## 2017-03-15 NOTE — PROGRESS NOTES
conducted an initial consultation and Spiritual Assessment for Vipul Taylor, who is a 80 y.o.,male. Patients Primary Language is: [place]. According to the patients EMR Hindu Affiliation is: Restorationism.     The reason the Patient came to the hospital is:   Patient Active Problem List    Diagnosis Date Noted    CHF (congestive heart failure) ([facility] 03/14/2017    Pneumonia 03/14/2017    Acute renal insufficiency 03/14/2017    Atrial fibrillation ([facility] 12/18/2016    Thrombocytopenia ([address] 12/18/2016    Elevated troponin 12/18/2016    HAN (acute kidney injury) ([facility] [address] [facility] 12/18/2016    Acute on chronic congestive heart failure ([facility] 12/18/2016    Acute exacerbation of CHF (congestive heart failure) ([facility] 12/18/2016    TIA (transient ischemic attack) 08/16/2016    Dyslipidemia 02/19/2013    Essential hypertension, benign 02/19/2013    Impotence of organic origin     Malignant neoplasm of prostate ([facility]     CABG (coronary artery bypass graft) 02/01/2011    Status post coronary artery stent placement 02/01/2011    Mitral and aortic valve regurgitation 02/01/2011    CAD (coronary artery disease)     Ischemic cardiomyopathy     S/P biventricular automatic cardioverter/defibrillator (AICD) upgrade October 2009. The  provided the following Interventions:  Initiated a relationship of care and support. Explored issues of chevy, belief, spirituality and Yarsani/ritual needs while hospitalized. Listened empathically. Provided information about Spiritual Care Services. Offered prayer and assurance of continued prayers on patient's behalf. Chart reviewed. The following outcomes where achieved:  Patient shared limited information about both their medical narrative and spiritual journey/beliefs.  confirmed Patient's Hindu Affiliation. Patient processed feeling about current hospitalization.   Patient expressed gratitude for 's visit. Assessment:  Patient does not have any Catholic/cultural needs that will affect patients preferences in health care. There are no spiritual or Catholic issues which require intervention at this time. Plan:  Chaplains will continue to follow and will provide pastoral care on an as needed/requested basis.  recommends bedside caregivers page  on duty if patient shows signs of acute spiritual or emotional distress.       [facility]   [phone]

## 2017-03-15 NOTE — HOSPICE
TC to pt daughter Nadja Chapin re hospice referral/inormational meeting. Ms. Real Mcdowell is unable to meet with hospice today as she is working, but is able to meet with hospice at the bedside tomorrow at noon.   Hospice info visit has been scheduled at pt bedside tomorrow at 12PM.

## 2017-03-15 NOTE — PROGRESS NOTES
NUTRITION    BPA/MST Referral    Nutrition Consult: General Nutrition Management & Supplements      RECOMMENDATIONS / PLAN:     - Add supplement: Ensure Enlive BID  - Continue RD inpatient monitoring and evaluation. NUTRITION INTERVENTIONS & DIAGNOSIS:     [x] Meals/Snacks: modified diet  [x] Medical food supplementation: add     Nutrition Diagnosis:  Inadequate energy intake related to decreased appetite as evidenced by decreased meal intake x 1 week PTA    ASSESSMENT:     Subjective/Objective:   Patient unavailable at time of visit. Per H&P, pt reported his last full meal was about 1 week ago PTA and having a decreased appetite and unintentional weight loss. Noted consults to Palliative and Hospice, and family meeting with Hospice tomorrow    Average po intake adequate to meet patients estimated nutritional needs:   [] Yes     [] No   [x] Unable to determine at this time    Diet: DIET CARDIAC Regular      Food Allergies:  None known   Current Appetite:   [] Good     [] Fair     [] Poor     [x] Other: unknown   Appetite/meal intake prior to admission:   [] Good     [] Fair     [x] Poor (per nutrition screen)     [] Other:  Feeding Limitations:  [] Swallowing difficulty    [] Chewing difficulty    [] Other:  Current Meal Intake: No data found.       BM:   3/14  Skin Integrity:  No pressure ulcer or wound  Edema:  None   Pertinent Medications: Reviewed    Recent Labs      03/15/17   0333  03/14/17   1047   NA  138  135*   K  3.5  4.8   CL  100  100   CO2  27  23   GLU  81  149*   BUN  52*  61*   CREA  1.90*  2.30*   CA  8.7  8.8   ALB   --   3.7   SGOT   --   87*   ALT   --   123*       Intake/Output Summary (Last 24 hours) at 03/15/17 1601  Last data filed at 03/14/17 2238   Gross per 24 hour   Intake              120 ml   Output                0 ml   Net              120 ml       Anthropometrics:  Ht Readings from Last 1 Encounters:   03/14/17 5' 6.5\" (1.689 m)     Last 3 Recorded Weights in this Encounter 03/14/17 1031   Weight: 62.1 kg (137 lb)     Body mass index is 21.78 kg/(m^2). Weight History:   Per nutrition screen and H&P, pt reported unplanned weight loss PTA. Noted weight loss of 5 lbs in past 3 weeks PTA    Weight Metrics 3/14/2017 3/10/2017 3/7/2017 2/28/2017 1/17/2017 1/11/2017 12/30/2016   Weight 137 lb 136 lb 12.8 oz 136 lb 6.4 oz 142 lb 134 lb 129 lb 9.6 oz 131 lb 3.2 oz   BMI 21.78 kg/m2 21.43 kg/m2 21.36 kg/m2 22.24 kg/m2 20.99 kg/m2 20.3 kg/m2 20.55 kg/m2        Admitting Diagnosis: Pneumonia  CHF (congestive heart failure) ([facility] [address]  Acute renal insufficiency  Past Medical History:   Diagnosis Date    Aortic insufficiency     stable    Bilateral renal artery stenosis (HCC)     CAD (coronary artery disease)     Appears stable. Status post CABG in 1992, with LIMA to LAD and diagonal branch, sequential SVG to OM1 and OM2, and sequential SVG to distal PCA and PDA.  Cancer [facility])     Prostate, now in remission.  Cardiac catheterization 10/13/2016    Severe native CAD. LAD, Cx, & mRCA 100%. LIMA to LAD patent. SVG to OM1 & OM2 patent. SVG to RPDA and PLB 80% at insert site to RPDA (3 x 18-mm Xience JOSE, resid 0%).  Cardiac echocardiogram abnormal 10/05/2016    Mod LVE. EF 35% (prev 40% on 11/5/15). Severe basal-mid inferior & basal-mid inferolateral hypk. Mild LVH. Gr 3 DDfx. Mild RVE. RVSP 65 mmHg. Severe LAE. Mild ESTELA. Severe MR. Mod AI. Mod-severe TR.  IVCE. AI, MR, PAP have worsened since study of 11/5/15.  Cardiac nuclear imaging test, high risk 10/05/2016    High risk. Lg fixed basal inferior, mid inferior & inferolateral defect related to infarction w/o anirudh-infarct ischemia in RCA. Sm, fixed high anterolateral defect, likely infarction w/o ischemia. Massive LVE. Mod inferior, inferolateral hypk. Marked hypk of whole apical complex. EF 27% (prev 35% in study of 12/2014).     Cardiovascular LE venous duplex 11/02/2016    Right leg:  No DVT or venous reflux. Left leg:  Deep venous reflux of mid fem, popliteal AK and BK. Reflux time 2.0 secs.  Cardiovascular renal duplex 05/06/2015    No significant RA stenosis.  Carotid duplex 08/19/2016    Mod 50-69% bilateral ICA stenosis. Probable significant bilateral ECA stenosis. Less than 50% stenosis of both subclavians. Mod calcified plaque in bilateral CCA & bilateral ICA & bulb. No signficant chg from study of 9/10/15.  Dyslipidemia     on Zocor.  Gastroenteritis 7/2010    Hospitalized and given IV fluids and released.  Hearing loss in right ear     Hydronephrosis     bilateral    Impotence of organic origin     Ischemic cardiomyopathy     Severe, at least symptomatically, appears to be doing better.  Malignant neoplasm of prostate ([address]     Mitral regurgitation     at one time, he has severe mitral regurgitation. However, after his SVG to PDA stent, his mitral regurgitation improved only mild. His last echocardiogram of 2012 showed mild MR.  Status post insertion of drug eluting coronary artery stent 1/2011    SVG to PDA seq (PLB) stented with 2.75mm Cypher    Stroke [facility]) 08/10/2016       Education Needs:        [x] None identified  [] Identified - Not appropriate at this time  []  Identified and addressed - refer to education log  Learning Limitations:   [x] None identified  [] Identified    Cultural, Sabianism & ethnic food preferences:  [x] None identified    [] Identified and addressed     ESTIMATED NUTRITION NEEDS:     Calories: 7359-3531 kcal (MSJx1.2-1.3) based on  [x] Actual BW:  62 kg    [] IBW:   Protein: 68-74 gm (1.1-1.2 gm/kg) based on  [x] Actual BW:  62 kg    [] IBW:   Fluid: 1 mL/kcal     MONITORING & EVALUATION:     Nutrition Goal(s):   1. Po intake of meals will meet >75% of patient estimated nutritional needs within the next 7 days.   Outcome:  [] Met/Ongoing    []  Not Met    [x] New/Initial Goal     Monitoring:   [x] Diet tolerance   [x] Meal intake   [x] Supplement intake   [] GI symptoms/ability to tolerate po diet   [] Respiratory status   [] Plan of care      Previous Recommendations (for follow-up assessments only):     []   Implemented       []   Not Implemented (RD to address)     [] No Recommendation Made     Discharge Planning:  Cardiac diet   [x] Participated in care planning, discharge planning, & interdisciplinary rounds as appropriate      Myrl Lesches, [address] [place] [address]   Pager: [phone]

## 2017-03-15 NOTE — H&P
[address] [facility]  ROUTINE H AND PS    Name:  Silver Peters  MR#:  [id number]  :  1928  Account #:  [de-identified]  Date of Adm:  2017      CHIEF COMPLAINT: Dyspnea on exertion. PRIMARY CARE PHYSICIAN: Pam Chatman MD.    HISTORY OF PRESENT ILLNESS: This is an 41-year-old white male  with a past medical history of TIA, coronary artery disease, status post  CABG. He has chronic systolic CHF with an EF of around 30%. He  was admitted over [facility] this past year, he was given a milrinone  drip. He improved in terms of his dyspnea. He was discharged to  home. He was getting around pretty well. He had his birthday at  [facility] in February, he did very well. His last full meal was  about 1 week ago, although he does describe a decreased appetite. He denies any fevers, sweats or chills at home. No chest pain, but he  has dyspnea on exertion that has been worsening. He is barely able to  walk from the chair to the bathroom. This morning, family had a difficult  time getting him into the car. He has begun to experience shortness of  breath at rest. His orthopnea has been stable at 3 pillows. He does have  PND, but this has been helped by nightly Ativan. He denies any  nausea, vomiting, diarrhea, constipation. No burning with urination. No  hematuria. No hematochezia. No falls at home. He does have lower  extremity edema. No rash. He has had a chronic headache ever since  his daughters were little girls. He has had multiple workups for this. He  denies any blurred vision, no double vision. He admits to decreased  appetite and unintentional weight loss. There apparently is a durable  DO NOT RESUSCITATE at home, but daughters did not bring it  when they presented [facility] at the advice of Dr. Chico Moreau. He was just started on home oxygen 2 liters at home during the day,  the oxygen was delivered yesterday evening.  At my interview, the  patient states that he wants to go home with hospice, he does not want  to be resuscitated. He is very insightful and describes his quality of life. I asked him about his AICD. He states that he wants it to  be deactivated, and I spoke with the [address] [place] [address] Liban Bettencourt, [phone]. PAST MEDICAL HISTORY  1. Chronic systolic congestive heart failure with an EF of 30%, [place]  [place] Heart Association class 4.  2. TIA. 3. Coronary artery disease. 4. Chronic kidney disease stage III. 5. Chronic respiratory failure, on home oxygen 18 hours daily. 6. Hypothyroidism. ALLERGIES: LISINOPRIL CAUSED A COUGH. HOME MEDICATIONS  1. Aspirin 81 mg p.o. daily. 2. Bumex 1 mg p.o. twice daily. 3. Coreg 25 mg p.o. twice daily. 4. Plavix 75 mg p.o. daily. 5. Digoxin 0.125 mg p.o. daily. 6. Synthroid 50 mcg p.o. daily before breakfast.  7. Ativan 0.5 mg p.o. nightly. 8. Multivitamin 1 tab p.o. daily. 9. Zocor 40 mg p.o. at bedtime. 10. Aldactone 25 mg p.o. daily. [address] 12. Home oxygen 2 L during the day. PAST SURGICAL HISTORY  1. Four vessel CABG in .  2. Cholecystectomy in .  3. Heart catheterization in  with drug-eluting stent. 4. Hiatal hernia repair,  and .  5. AICD implantation, . 6. Upgrade to a Guidant single-chamber ICD [employer] BiV  pacemaker AICD with atrial lead insertion 10/23/2009. SOCIAL HISTORY: He denies tobacco, denies secondhand smoke  exposure. Denies alcohol or illicits. He resides with his girlfriend. He  has 2 daughters at the bedside, 1 resides in [place], 1 resides in  [place]. They are very involved in his care. His advanced directive and  durable DO NOT RESUSCITATE are scanned into the computer. He  used to work in the [employer]. He is  . FAMILY HISTORY: Mom had heart disease. Father had heart failure. Brother had a heart transplant and later .     PHYSICAL EXAMINATION  VITAL SIGNS: Temperature 97.8, pulse 75, blood pressure 117/58,  respiratory rate 19, saturating 96% on 2 L nasal cannula. GENERAL: He is awake, alert and oriented x4, sharp, 2 daughters at  the bedside. HEENT: Normocephalic, atraumatic. Pupils equally round and reactive  to light. No scleral icterus, no conjunctival pallor. Oropharynx is clear. NECK: Supple. JVD is present. No cervical lymphadenopathy. No  carotid bruit. CARDIOVASCULAR: S1, S2, regular rate and rhythm. No murmur. CHEST WALL: Pacemaker, AICD to his left chest, site is clean. LUNGS: Clear to auscultation bilaterally. ABDOMEN: Soft, nontender, nondistended, normoactive bowel  sounds. EXTREMITIES: Trace pitting edema up to his mid shin. Dorsalis pedis  pulses 2+ bilaterally. NEUROLOGIC: Cranial nerves 2 through 12 grossly intact, moving all  4 extremities. SKIN: No rashes. No lesions. LABORATORY DATA: WBC 7.1, hemoglobin and hematocrit 14.3 and  43.9, .3, platelets 92, neutrophils 71%, no bands. Sodium  135, potassium 4.8, chloride 100, CO2 23, BUN 61, creatinine 2.3,  glucose 149, calcium 8.8, total bilirubin 2.1, , AST 87. CK-MB  1.9, troponin 0.05. BNP 3650. IMAGING: Chest x-ray, pacemaker. Left-sided pleural effusion with  associated atelectatic changes, developing left lower lobe retrocardiac  region infiltrate could not be excluded. EKG: Electronic ventricular pacemaker, rate 70. Compared with  previous EKG of 03/07/2017, no significant change was found. ASSESSMENT AND PLAN  1. Acute-on-chronic systolic congestive heart failure, [place] Heart  Association Class 4. Will continue him on his Bumex and Coreg. I have  reduced the dose of Coreg, given his low blood pressures, ARB has  been discontinued in the outpatient setting. Will continue to hold here. Cardiology to consult. The patient has elected for hospice. Durable DO  NOT RESUSCITATE has been signed and he has elected to  deactivate his AICD. 2. History of transient ischemic attack.  Continue statin and aspirin. 3. Coronary artery disease. See previous number. Continue aspirin,  beta blocker, statin. Hold ARB given relative hypotension. 4. Chronic kidney disease, stage III, creatinine is up slightly from his  baseline, will monitor closely. 5. Hypoxia. He was 82% on room air upon presentation to the ED at  [facility]. 6. Chronic respiratory failure on 2 L nasal cannula, 18 hours daily. 7. Elevated liver function tests in the setting of CHF, hepatic  congestion. 8. Hypothyroidism. Continue Synthroid. 9. Deep venous thrombosis prophylaxis. 10. Disposition: DURABLE DO NOT RESUSCITATE. Hospice has  been consulted. [employer] has been consulted regarding  deactivation of his AICD. TIME SPENT: 70 minutes.         Ben Delacruz M.D.    Maura Hayes / Arianne Bernabe  D:  03/14/2017   18:22  T:  03/14/2017   20:17  Job #:  [id number]

## 2017-03-16 NOTE — CDMP QUERY
\"Acute renal insufficiency\" was listed in the hospital problem list but not documented. Please clarify. =>HAN  =>Acute renal failure  =>Acute renal insufficiency  =>Other Explanation of clinical findings  =>Unable to Determine (no explanation of clinical findings)    The medical record reflects the following clinical findings, treatment, and risk factors:  --H&P states \"Chronic kidney disease, stage III, creatinine is up slightly from his baseline, will monitor closely\"  --receiving serial BMP  --3/14/2017 10:47:  BUN: 61 (H), Creatinine: 2.30 (H), BUN/Creatinine ratio: 27 (H), GFR est non-AA: 27 (L), GFR est AA: 33 (L)  --3/15/2017 03:33:  BUN: 52 (H), Creatinine: 1.90 (H), BUN/Creatinine ratio: 27 (H), GFR est non-AA: 34 (L), GFR est AA: 41 (L)    Please clarify and document your clinical opinion in the progress notes and discharge summary including the definitive and/or presumptive diagnosis, (suspected or probable), related to the above clinical findings. Please include clinical findings supporting your diagnosis. If you DECLINE this query or would like to communicate with [facility], please utilize the \"[employer] message box\" at the TOP of the Progress Note on the right. QUESTIONS?    Iza Courtney RN  [facility] [phone]

## 2017-03-16 NOTE — DISCHARGE SUMMARY
Discharge Summary    Patient: Sanket Santana MRN: [id number]  CSN: [id number]    YOB: 1928  Age: 80 y.o. Sex: male    DOA: 3/14/2017 LOS:  LOS: 2 days   Discharge Date:      Admission Diagnoses: Pneumonia  CHF (congestive heart failure) ([facility] [address]  Acute renal insufficiency    Discharge Diagnoses:  PLEASE SEE DICTATION. Discharge Condition: Stable    PHYSICAL EXAM  Visit Vitals    /64 (BP 1 Location: Left arm, BP Patient Position: Sitting)    Pulse 70    Temp 95.6 °F (35.3 °C)    Resp 16    Ht 5' 6.5\" (1.689 m)    Wt 58.6 kg (129 lb 3 oz)    SpO2 97%    BMI 20.54 kg/m2       General: Alert, cooperative, no acute distress    HEENT: Mild left eye redness  Lungs:  CTA Bilaterally. Heart:  Regular rate and Rhythm. Abdomen: Soft, Non distended, Non tender. + Bowel sounds. Extremities: No edema/ cyanosis/ clubbing  Psych:   Good insight. Not anxious or agitated. Neurologic:  AA oriented X 3. Moves all extremities. Hospital Course: Please see dictation. code # [id number]. Discharge Medications:     Current Discharge Medication List      START taking these medications    Details   naphazoline-pheniramine (NAPHCON A) 0.025-0.3 % ophthalmic solution Administer 2 Drops to left eye four (4) times daily as needed for Other (itching). Qty: 15 mL, Refills: 0      polyvinyl alcohol (LIQUIFILM TEARS) 1.4 % ophthalmic solution Administer 1 Drop to left eye as needed for up to 30 days. Qty: 15 mL, Refills: 0         CONTINUE these medications which have CHANGED    Details   carvedilol (COREG) 3.125 mg tablet Take 1 Tab by mouth two (2) times daily (with meals). Qty: 60 Tab, Refills: 0         CONTINUE these medications which have NOT CHANGED    Details   LORazepam (ATIVAN) 0.5 mg tablet Take 0.5 mg by mouth nightly. Walker misc Wheeled walker  Qty: 1 Each, Refills: 0      bumetanide (BUMEX) 1 mg tablet Take 1 Tab by mouth two (2) times a day.   Qty: 60 Tab, Refills: 1      levothyroxine (SYNTHROID) 50 mcg tablet Take 1 Tab by mouth Daily (before breakfast). Qty: 30 Tab, Refills: 1      simvastatin (ZOCOR) 40 mg tablet Take 1 Tab by mouth nightly. Qty: 30 Tab, Refills: 3      clopidogrel (PLAVIX) 75 mg tablet TAKE 1 TABLET EVERY DAY  Qty: 90 Tab, Refills: 3      multivitamin (ONE A DAY) tablet Take 1 Tab by mouth daily. Associated Diagnoses: CAD (coronary artery disease); Ischemic cardiomyopathy; Mitral and aortic valve regurgitation      aspirin 81 mg tablet Take 81 mg by mouth daily. STOP taking these medications       spironolactone (ALDACTONE) 25 mg tablet Comments:   Reason for Stopping:         digoxin (LANOXIN) 0.125 mg tablet Comments:   Reason for Stopping:             · It is important that you take the medication exactly as they are prescribed. · Keep your medication in the bottles provided by the pharmacist and keep a list of the medication names, dosages, and times to be taken in your wallet. · Do not take other medications without consulting your doctor. DIET:  Cardiac Diet    ACTIVITY: Activity as tolerated  · Home health care for Skilled care for hospice care. FOLLOW UP CARE:  Dr. Chato Addison MD as needed.     Minutes spent on discharge: 40 minutes spent coordinating this discharge (review instructions/follow-up, prescriptions, preparing report for sign off)    Lexi Haywood MD  3/16/2017 1:00 PM

## 2017-03-16 NOTE — CDMP QUERY
\"Pneumonia\" is listed in the hospital problem list but not documented on progress notes. Please clarify. =>Pneumonia treated and resolved  =>Pneumonia ongoing or improving  =>Pneumonia ruled out  =>Other Explanation of clinical findings  =>Unable to Determine (no explanation of clinical findings)    The medical record reflects the following clinical findings, treatment, and risk factors:  --3/14 CXR - Developing left lower lobe/retrocardiac region infiltrate cannot be excluded however. --given Rocephin IV X 1 dose    Please clarify and document your clinical opinion in the progress notes and discharge summary including the definitive and/or presumptive diagnosis, (suspected or probable), related to the above clinical findings. Please include clinical findings supporting your diagnosis. If you DECLINE this query or would like to communicate with [employer], please utilize the \"[employer] message box\" at the TOP of the Progress Note on the right. QUESTIONS?    Katy Rogers RN  [facility] [phone]

## 2017-03-16 NOTE — CDMP QUERY
\"HTN\" is listed on the \"Non-Hospital (Problems not being addressed during this admission)\". Please clarify this diagnosis. =>HTN is current/ongoing  =>HTN is resolved/no longer exists  =>Other Explanation of clinical findings  =>Unable to Determine (no explanation of clinical findings)    The medical record reflects the following clinical findings, treatment, and risk factors:  --receiving Coreg  --H&P states:  Hold ARB given relative hypotension  --3/14 BP - 114/52, 125/61, 113/56, 120/57, 109/55, 117/58, 124/71, 120/69  --3/15 BP - 117/73, 116/62, 108/63, 111/66, 104/66, 113/63  --3/16 BP - 112/65, 107/59, 104/65, 100/64    Please clarify and document your clinical opinion in the progress notes and discharge summary including the definitive and/or presumptive diagnosis, (suspected or probable), related to the above clinical findings. Please include clinical findings supporting your diagnosis. If you DECLINE this query or would like to communicate with [facility], please utilize the \"[facility] message box\" at the TOP of the Progress Note on the right. QUESTIONS?    Jonna Long RN  [facility] [phone]

## 2017-03-16 NOTE — PROGRESS NOTES
Care Management Interventions  PCP Verified by CM: Yes (DR. Max Solano)  Palliative Care Consult (Criteria: CHF and RRAT>21): No  Reason for No Palliative Care Consult: Other (see comment) (NO RODER)  Mode of Transport at Discharge: Other (see comment) (PT'S FAMILY WILL TRANSPORT)  Transition of Care Consult (CM Consult): Discharge Planning, Home Health ([address])  [address]: Yes  Current Support Network: Relative's Home, Family Lives Nearby  Confirm Follow Up Transport: Family  Plan discussed with Pt/Family/Caregiver: Yes (PT IS AGREEABLE TO TRANSPORT HOME BY HIS DAUGHTERS WHO ARE HIS BEDSIDE.   PT;S NURSE MADE AWARE)  [place] of Choice Offered:  ([address])  Discharge Location  Discharge Placement: Home with home health

## 2017-03-16 NOTE — DISCHARGE INSTRUCTIONS
Discharge Instructions    Patient: Josiane Brock MRN: [id number]  CSN: [id number]    YOB: 1928  Age: 80 y.o. Sex: male    DOA: 3/14/2017 LOS:  LOS: 2 days   Discharge Date:      DIET:  Cardiac Diet    ACTIVITY: Activity as tolerated  · Home health care for Skilled care for hospice care. FOLLOW UP CARE:  Dr. Jimmy Cheung MD as needed. Dee Pritchard MD  3/16/2017 12:59 PM          DISCHARGE SUMMARY from Nurse    The following personal items are in your possession at time of discharge:    Dental Appliances: Partials  Visual Aid: None     Home Medications: None  Jewelry: None  Clothing: Footwear, Jacket/Coat, Pants, Shirt, Socks, Undergarments  Other Valuables: None  Personal Items Sent to Safe:  (none)          PATIENT INSTRUCTIONS:    After general anesthesia or intravenous sedation, for 24 hours or while taking prescription Narcotics:  · Limit your activities  · Do not drive and operate hazardous machinery  · Do not make important personal or business decisions  · Do  not drink alcoholic beverages  · If you have not urinated within 8 hours after discharge, please contact your surgeon on call. Report the following to your surgeon:  · Excessive pain, swelling, redness or odor of or around the surgical area  · Temperature over 100.5  · Nausea and vomiting lasting longer than 4 hours or if unable to take medications  · Any signs of decreased circulation or nerve impairment to extremity: change in color, persistent  numbness, tingling, coldness or increase pain  · Any questions        What to do at Home:  Recommended activity: Activity as tolerated    If you experience any of the following symptoms Fever, chills, nausea, vomiting, shortness of breath, or increase in pain please follow up with PCP. *  Please give a list of your current medications to your Primary Care Provider.     *  Please update this list whenever your medications are discontinued, doses are changed, or new medications (including over-the-counter products) are added. *  Please carry medication information at all times in case of emergency situations. These are general instructions for a healthy lifestyle:    No smoking/ No tobacco products/ Avoid exposure to second hand smoke    Surgeon General's Warning:  Quitting smoking now greatly reduces serious risk to your health. Obesity, smoking, and sedentary lifestyle greatly increases your risk for illness    A healthy diet, regular physical exercise & weight monitoring are important for maintaining a healthy lifestyle    You may be retaining fluid if you have a history of heart failure or if you experience any of the following symptoms:  Weight gain of 3 pounds or more overnight or 5 pounds in a week, increased swelling in our hands or feet or shortness of breath while lying flat in bed. Please call your doctor as soon as you notice any of these symptoms; do not wait until your next office visit. Recognize signs and symptoms of STROKE:    F-face looks uneven    A-arms unable to move or move unevenly    S-speech slurred or non-existent    T-time-call 911 as soon as signs and symptoms begin-DO NOT go       Back to bed or wait to see if you get better-TIME IS BRAIN. Warning Signs of HEART ATTACK     Call 911 if you have these symptoms:   Chest discomfort. Most heart attacks involve discomfort in the center of the chest that lasts more than a few minutes, or that goes away and comes back. It can feel like uncomfortable pressure, squeezing, fullness, or pain.  Discomfort in other areas of the upper body. Symptoms can include pain or discomfort in one or both arms, the back, neck, jaw, or stomach.  Shortness of breath with or without chest discomfort.  Other signs may include breaking out in a cold sweat, nausea, or lightheadedness. Don't wait more than five minutes to call 911 - MINUTES MATTER! Fast action can save your life. Calling 911 is almost always the fastest way to get lifesaving treatment. Emergency Medical Services staff can begin treatment when they arrive -- up to an hour sooner than if someone gets to the hospital by car. The discharge information has been reviewed with the patient. The patient verbalized understanding. Discharge medications reviewed with the patient and appropriate educational materials and side effects teaching were provided. Patient armband removed and shredded  MyChart Activation    Thank you for requesting access to [facility]. Please follow the instructions below to securely access and download your online medical record. [facility] allows you to send messages to your doctor, view your test results, renew your prescriptions, schedule appointments, and more. How Do I Sign Up? 1. In your internet browser, go to [email or web address]  2. Click on the First Time User? Click Here link in the Sign In box. You will be redirect to the New Member Sign Up page. 3. Enter your [facility] Access Code exactly as it appears below. You will not need to use this code after youve completed the sign-up process. If you do not sign up before the expiration date, you must request a new code. [facility] Access Code: [email or web address]  Expires: 2017 10:42 AM (This is the date your [facility] access code will )    4. Enter the last four digits of your Social Security Number (xxxx) and Date of Birth (mm/dd/yyyy) as indicated and click Submit. You will be taken to the next sign-up page. 5. Create a [facility] ID. This will be your [facility] login ID and cannot be changed, so think of one that is secure and easy to remember. 6. Create a [facility] [employer] password. You can change your password at any time. 7. Enter your Password Reset Question and Answer. This can be used at a later time if you forget your password. 8. Enter your e-mail address.  You will receive e-mail notification when new information is available in [facility]. 9. Click Sign Up. You can now view and download portions of your medical record. 10. Click the Download Summary menu link to download a portable copy of your medical information. Additional Information    If you have questions, please visit the Frequently Asked Questions section of the [employer] website at [email or web address] Remember, [facility] is NOT to be used for urgent needs. For medical emergencies, dial 911. Pneumonia: Care Instructions  Your Care Instructions    Pneumonia is an infection of the lungs. Most cases are caused by infections from bacteria or viruses. Pneumonia may be mild or very severe. If it is caused by bacteria, you will be treated with antibiotics. It may take a few weeks to a few months to recover fully from pneumonia, depending on how sick you were and whether your overall health is good. Follow-up care is a key part of your treatment and safety. Be sure to make and go to all appointments, and call your doctor if you are having problems. Its also a good idea to know your test results and keep a list of the medicines you take. How can you care for yourself at home? · Take your antibiotics exactly as directed. Do not stop taking the medicine just because you are feeling better. You need to take the full course of antibiotics. · Take your medicines exactly as prescribed. Call your doctor if you think you are having a problem with your medicine. · Get plenty of rest and sleep. You may feel weak and tired for a while, but your energy level will improve with time. · To prevent dehydration, drink plenty of fluids, enough so that your urine is light yellow or clear like water. Choose water and other caffeine-free clear liquids until you feel better. If you have kidney, heart, or liver disease and have to limit fluids, talk with your doctor before you increase the amount of fluids you drink.   · Take care of your cough so you can rest. A cough that brings up mucus from your lungs is common with pneumonia. It is one way your body gets rid of the infection. But if coughing keeps you from resting or causes severe fatigue and chest-wall pain, talk to your doctor. He or she may suggest that you take a medicine to reduce the cough. · Use a vaporizer or humidifier to add moisture to your bedroom. Follow the directions for cleaning the machine. · Do not smoke or allow others to smoke around you. Smoke will make your cough last longer. If you need help quitting, talk to your doctor about stop-smoking programs and medicines. These can increase your chances of quitting for good. · Take an over-the-counter pain medicine, such as acetaminophen (Tylenol), ibuprofen (Advil, Motrin), or naproxen (Aleve). Read and follow all instructions on the label. · Do not take two or more pain medicines at the same time unless the doctor told you to. Many pain medicines have acetaminophen, which is Tylenol. Too much acetaminophen (Tylenol) can be harmful. · If you were given a spirometer to measure how well your lungs are working, use it as instructed. This can help your doctor tell how your recovery is going. · To prevent pneumonia in the future, talk to your doctor about getting a flu vaccine (once a year) and a pneumococcal vaccine (one time only for most people). When should you call for help? Call 911 anytime you think you may need emergency care. For example, call if:  · You have severe trouble breathing. Call your doctor now or seek immediate medical care if:  · You cough up dark brown or bloody mucus (sputum). · You have new or worse trouble breathing. · You are dizzy or lightheaded, or you feel like you may faint. Watch closely for changes in your health, and be sure to contact your doctor if:  · You have a new or higher fever. · You are coughing more deeply or more often. · You are not getting better after 2 days (48 hours).   · You do not get better as expected. Where can you learn more? Go to http://krista-jacqueline.info/. Enter [id number] in the search box to learn more about \"Pneumonia: Care Instructions. \"  Current as of: May 23, 2016  Content Version: 11.1  © 8671-2436 [employer]. Care instructions adapted under license by [employer] (which disclaims liability or warranty for this information). If you have questions about a medical condition or this instruction, always ask your healthcare professional. [address] any warranty or liability for your use of this information. Learning About Self-Care for Heart Failure  What is self-care for heart failure? Heart failure usually gets worse over time. But there are many things you can do to feel better, stay healthy longer, and avoid the hospital.  Self-care means managing your health by doing certain things every day, like weighing yourself. It's about knowing which symptoms to watch for so you can avoid getting worse. When you practice good self-care, you know when it's time to call your doctor and when your heart failure has turned into an emergency. The lists below can help. Top 5 self-care tips for every day  6. Take your medicines as prescribed. This gives them the best chance of helping you. 7. Watch for signs that you're getting worse. Weighing yourself every day is one of the best ways to do this. Weight gain may be a sign that your body is holding on to too much fluid. Weigh yourself at the same time each day, using the same scale, on a hard, flat surface. The best time is in the morning after you go to the bathroom and before you eat or drink anything. 8. Find out what your triggers are, and learn to avoid them. Triggers are things that make your heart failure worse, often suddenly. A trigger may be eating too much salt, missing a dose of your medicine, or exercising too hard. 9. Limit sodium.  This helps keep fluid from building up and makes it easier for your heart to pump. Your doctor may suggest that you limit sodium to 2,000 milligrams (mg) a day or less. That's less than 1 teaspoon. You can stay under this amount by limiting the salt you eat at home and by watching for \"hidden\" sodium when you eat out or shop for food. 10. Try to exercise throughout the week. Exercise makes your heart stronger and can help you avoid symptoms. Walking is a great way to get exercise. If your doctor says it's safe, start out with some short walks, and then slowly build up to longer ones. When should you act? Try to become familiar with signs that mean your heart failure is getting worse. If you need help, talk with your doctor about making a personal plan. Here are some things to watch for as you practice your daily self-care. Call your doctor if:  · You gain 3 pounds or more over 2 to 3 days. (Or your doctor may tell you how much weight to watch for.)  · You have new or worse swelling in your feet, ankles, or legs. · Your breathing gets worse. Activities that did not make you short of breath before are hard for you now. · Your breathing when you lie down is worse than usual, or you wake up at night needing to catch your breath. Be sure to make and go to all of your follow-up appointments. And it's always a good idea to call your doctor anytime you have a sudden change in symptoms. When is it an emergency? Sometimes the symptoms get worse very quickly. This is called sudden heart failure. It causes fluid to build up in your lungs. Sudden heart failure is an emergency. If you have any of these symptoms, you need care right away. Call 911 if:  · You have severe shortness of breath. · You have an irregular or fast heartbeat. · You cough up foamy, pink mucus. What else can you do to stay healthy? There are other things you can do to take care of your body and your heart. These things will help you feel better.  And they will also reduce your risk of heart attack and stroke. · Try to stay at a healthy weight. Eat a healthy diet with lots of fresh fruit, vegetables, and whole grains. · If you smoke, quit. · Limit the amount of alcohol you drink. · Keep high blood pressure and diabetes under control. If you need help, talk with your doctor. If your doctor has not set you up with a cardiac rehabilitation (rehab) program, talk to him or her about whether that is right for you. Cardiac rehab includes exercise, help with diet and lifestyle changes, and emotional support. Also let your doctor know if:  · You're having trouble sleeping. Sleep is important to your well-being. It also helps your heart work the way it's supposed to. Your doctor can help you decide if you need treatment for sleep problems. · You're feeling sad and hopeless much of the time, or you are worried and anxious. Heart failure can be hard on your emotions. Treatment with counseling and medicine can help. And when you feel better, you're more likely to take care of yourself. Follow-up care is a key part of your treatment and safety. Be sure to make and go to all appointments, and call your doctor if you are having problems. It's also a good idea to know your test results and keep a list of the medicines you take. Where can you learn more? Go to http://krista-jacqueline.info/. Enter X171 in the search box to learn more about \"Learning About Self-Care for Heart Failure. \"  Current as of: January 27, 2016  Content Version: 11.1  © 8722-8494 [employer]. Care instructions adapted under license by [employer] (which disclaims liability or warranty for this information). If you have questions about a medical condition or this instruction, always ask your healthcare professional. [address] any warranty or liability for your use of this information.

## 2017-03-16 NOTE — INTERDISCIPLINARY ROUNDS
IDR/SLIDR Summary          Patient: Dianne Spaulding MRN: [id number]    Age: 80 y.o. YOB: 1928 Room/Bed: [facility]   Admit Diagnosis: Pneumonia  CHF (congestive heart failure) (HCC)  Acute renal insufficiency  Principal Diagnosis: <principal problem not specified>   Goals: control HF  Readmission: NO  Quality Measure: CHF  VTE Prophylaxis: Chemical  Influenza Vaccine screening completed? YES  Pneumococcal Vaccine screening completed? YES  Mobility needs: Yes   Nutrition plan:Yes  Consults:Respiratory and Case Management    Financial concerns:No  Escalated to CM? NO  RRAT Score: high   Interventions:Home Health  Testing due for pt today?  NO  LOS: 1 days Expected length of stay 3 days  Discharge plan: plans to return home   PCP: Alesha Bess MD  Transportation needs: No    Days before discharge:discharge pending  Discharge disposition: Home    Signed:     Moira Devine RN  3/15/2017  10:26 PM

## 2017-03-16 NOTE — PROGRESS NOTES
DISCHARGE SUMMARY from Nurse    The following personal items are in your possession at time of discharge:    Dental Appliances: Partials  Visual Aid: None     Home Medications: None  Jewelry: None  Clothing: Footwear, Jacket/Coat, Pants, Shirt, Socks, Undergarments  Other Valuables: None  Personal Items Sent to Safe:  (none)          PATIENT INSTRUCTIONS:    After general anesthesia or intravenous sedation, for 24 hours or while taking prescription Narcotics:  · Limit your activities  · Do not drive and operate hazardous machinery  · Do not make important personal or business decisions  · Do  not drink alcoholic beverages  · If you have not urinated within 8 hours after discharge, please contact your surgeon on call. Report the following to your surgeon:  · Excessive pain, swelling, redness or odor of or around the surgical area  · Temperature over 100.5  · Nausea and vomiting lasting longer than 4 hours or if unable to take medications  · Any signs of decreased circulation or nerve impairment to extremity: change in color, persistent  numbness, tingling, coldness or increase pain  · Any questions        What to do at Home:  Recommended activity: Activity as tolerated    If you experience any of the following symptoms Fever, chills, nausea, vomiting, shortness of breath, or increase in pain please follow up with PCP. *  Please give a list of your current medications to your Primary Care Provider. *  Please update this list whenever your medications are discontinued, doses are      changed, or new medications (including over-the-counter products) are added. *  Please carry medication information at all times in case of emergency situations. These are general instructions for a healthy lifestyle:    No smoking/ No tobacco products/ Avoid exposure to second hand smoke    Surgeon General's Warning:  Quitting smoking now greatly reduces serious risk to your health.     Obesity, smoking, and sedentary lifestyle greatly increases your risk for illness    A healthy diet, regular physical exercise & weight monitoring are important for maintaining a healthy lifestyle    You may be retaining fluid if you have a history of heart failure or if you experience any of the following symptoms:  Weight gain of 3 pounds or more overnight or 5 pounds in a week, increased swelling in our hands or feet or shortness of breath while lying flat in bed. Please call your doctor as soon as you notice any of these symptoms; do not wait until your next office visit. Recognize signs and symptoms of STROKE:    F-face looks uneven    A-arms unable to move or move unevenly    S-speech slurred or non-existent    T-time-call 911 as soon as signs and symptoms begin-DO NOT go       Back to bed or wait to see if you get better-TIME IS BRAIN. Warning Signs of HEART ATTACK     Call 911 if you have these symptoms:   Chest discomfort. Most heart attacks involve discomfort in the center of the chest that lasts more than a few minutes, or that goes away and comes back. It can feel like uncomfortable pressure, squeezing, fullness, or pain.  Discomfort in other areas of the upper body. Symptoms can include pain or discomfort in one or both arms, the back, neck, jaw, or stomach.  Shortness of breath with or without chest discomfort.  Other signs may include breaking out in a cold sweat, nausea, or lightheadedness. Don't wait more than five minutes to call [address]! Fast action can save your life. Calling 911 is almost always the fastest way to get lifesaving treatment. Emergency Medical Services staff can begin treatment when they arrive  up to an hour sooner than if someone gets to the hospital by car. The discharge information has been reviewed with the patient. The patient verbalized understanding.     Discharge medications reviewed with the patient and appropriate educational materials and side effects teaching were provided. Patient armband removed and shredded  MyChart Activation    Thank you for requesting access to [facility]. Please follow the instructions below to securely access and download your online medical record. [facility] allows you to send messages to your doctor, view your test results, renew your prescriptions, schedule appointments, and more. How Do I Sign Up? 1. In your internet browser, go to [email or web address]  2. Click on the First Time User? Click Here link in the Sign In box. You will be redirect to the New Member Sign Up page. 3. Enter your [facility] Access Code exactly as it appears below. You will not need to use this code after youve completed the sign-up process. If you do not sign up before the expiration date, you must request a new code. [facility] Access Code: [email or web address]  Expires: 2017 10:42 AM (This is the date your [facility] access code will )    4. Enter the last four digits of your Social Security Number (xxxx) and Date of Birth (mm/dd/yyyy) as indicated and click Submit. You will be taken to the next sign-up page. 5. Create a [facility] ID. This will be your [facility] login ID and cannot be changed, so think of one that is secure and easy to remember. 6. Create a [facility] password. You can change your password at any time. 7. Enter your Password Reset Question and Answer. This can be used at a later time if you forget your password. 8. Enter your e-mail address. You will receive e-mail notification when new information is available in [address]. 9. Click Sign Up. You can now view and download portions of your medical record. 10. Click the Download Summary menu link to download a portable copy of your medical information. Additional Information    If you have questions, please visit the Frequently Asked Questions section of the [facility] website at [email or web address] Remember, [facility] is NOT to be used for urgent needs.  For medical emergencies, dial 911. Pneumonia: Care Instructions  Your Care Instructions    Pneumonia is an infection of the lungs. Most cases are caused by infections from bacteria or viruses. Pneumonia may be mild or very severe. If it is caused by bacteria, you will be treated with antibiotics. It may take a few weeks to a few months to recover fully from pneumonia, depending on how sick you were and whether your overall health is good. Follow-up care is a key part of your treatment and safety. Be sure to make and go to all appointments, and call your doctor if you are having problems. Its also a good idea to know your test results and keep a list of the medicines you take. How can you care for yourself at home? · Take your antibiotics exactly as directed. Do not stop taking the medicine just because you are feeling better. You need to take the full course of antibiotics. · Take your medicines exactly as prescribed. Call your doctor if you think you are having a problem with your medicine. · Get plenty of rest and sleep. You may feel weak and tired for a while, but your energy level will improve with time. · To prevent dehydration, drink plenty of fluids, enough so that your urine is light yellow or clear like water. Choose water and other caffeine-free clear liquids until you feel better. If you have kidney, heart, or liver disease and have to limit fluids, talk with your doctor before you increase the amount of fluids you drink. · Take care of your cough so you can rest. A cough that brings up mucus from your lungs is common with pneumonia. It is one way your body gets rid of the infection. But if coughing keeps you from resting or causes severe fatigue and chest-wall pain, talk to your doctor. He or she may suggest that you take a medicine to reduce the cough. · Use a vaporizer or humidifier to add moisture to your bedroom. Follow the directions for cleaning the machine.   · Do not smoke or allow others to smoke around you. Smoke will make your cough last longer. If you need help quitting, talk to your doctor about stop-smoking programs and medicines. These can increase your chances of quitting for good. · Take an over-the-counter pain medicine, such as acetaminophen (Tylenol), ibuprofen (Advil, Motrin), or naproxen (Aleve). Read and follow all instructions on the label. · Do not take two or more pain medicines at the same time unless the doctor told you to. Many pain medicines have acetaminophen, which is Tylenol. Too much acetaminophen (Tylenol) can be harmful. · If you were given a spirometer to measure how well your lungs are working, use it as instructed. This can help your doctor tell how your recovery is going. · To prevent pneumonia in the future, talk to your doctor about getting a flu vaccine (once a year) and a pneumococcal vaccine (one time only for most people). When should you call for help? Call 911 anytime you think you may need emergency care. For example, call if:  · You have severe trouble breathing. Call your doctor now or seek immediate medical care if:  · You cough up dark brown or bloody mucus (sputum). · You have new or worse trouble breathing. · You are dizzy or lightheaded, or you feel like you may faint. Watch closely for changes in your health, and be sure to contact your doctor if:  · You have a new or higher fever. · You are coughing more deeply or more often. · You are not getting better after 2 days (48 hours). · You do not get better as expected. Where can you learn more? Go to http://krista-jacqueline.info/. Enter 01.84.63.10.33 in the search box to learn more about \"Pneumonia: Care Instructions. \"  Current as of: May 23, 2016  Content Version: 11.1  © 8128-7220 [employer]. Care instructions adapted under license by [email or web address] (which disclaims liability or warranty for this information).  If you have questions about a medical condition or this instruction, always ask your healthcare professional. [address] any warranty or liability for your use of this information. Learning About Self-Care for Heart Failure  What is self-care for heart failure? Heart failure usually gets worse over time. But there are many things you can do to feel better, stay healthy longer, and avoid the hospital.  Self-care means managing your health by doing certain things every day, like weighing yourself. It's about knowing which symptoms to watch for so you can avoid getting worse. When you practice good self-care, you know when it's time to call your doctor and when your heart failure has turned into an emergency. The lists below can help. Top 5 self-care tips for every day  6. Take your medicines as prescribed. This gives them the best chance of helping you. 7. Watch for signs that you're getting worse. Weighing yourself every day is one of the best ways to do this. Weight gain may be a sign that your body is holding on to too much fluid. Weigh yourself at the same time each day, using the same scale, on a hard, flat surface. The best time is in the morning after you go to the bathroom and before you eat or drink anything. 8. Find out what your triggers are, and learn to avoid them. Triggers are things that make your heart failure worse, often suddenly. A trigger may be eating too much salt, missing a dose of your medicine, or exercising too hard. 9. Limit sodium. This helps keep fluid from building up and makes it easier for your heart to pump. Your doctor may suggest that you limit sodium to 2,000 milligrams (mg) a day or less. That's less than 1 teaspoon. You can stay under this amount by limiting the salt you eat at home and by watching for \"hidden\" sodium when you eat out or shop for food. 10. Try to exercise throughout the week. Exercise makes your heart stronger and can help you avoid symptoms.  Walking is a great way to get exercise. If your doctor says it's safe, start out with some short walks, and then slowly build up to longer ones. When should you act? Try to become familiar with signs that mean your heart failure is getting worse. If you need help, talk with your doctor about making a personal plan. Here are some things to watch for as you practice your daily self-care. Call your doctor if:  · You gain 3 pounds or more over 2 to 3 days. (Or your doctor may tell you how much weight to watch for.)  · You have new or worse swelling in your feet, ankles, or legs. · Your breathing gets worse. Activities that did not make you short of breath before are hard for you now. · Your breathing when you lie down is worse than usual, or you wake up at night needing to catch your breath. Be sure to make and go to all of your follow-up appointments. And it's always a good idea to call your doctor anytime you have a sudden change in symptoms. When is it an emergency? Sometimes the symptoms get worse very quickly. This is called sudden heart failure. It causes fluid to build up in your lungs. Sudden heart failure is an emergency. If you have any of these symptoms, you need care right away. Call 911 if:  · You have severe shortness of breath. · You have an irregular or fast heartbeat. · You cough up foamy, pink mucus. What else can you do to stay healthy? There are other things you can do to take care of your body and your heart. These things will help you feel better. And they will also reduce your risk of heart attack and stroke. · Try to stay at a healthy weight. Eat a healthy diet with lots of fresh fruit, vegetables, and whole grains. · If you smoke, quit. · Limit the amount of alcohol you drink. · Keep high blood pressure and diabetes under control. If you need help, talk with your doctor.   If your doctor has not set you up with a cardiac rehabilitation (rehab) program, talk to him or her about whether that is right for you. Cardiac rehab includes exercise, help with diet and lifestyle changes, and emotional support. Also let your doctor know if:  · You're having trouble sleeping. Sleep is important to your well-being. It also helps your heart work the way it's supposed to. Your doctor can help you decide if you need treatment for sleep problems. · You're feeling sad and hopeless much of the time, or you are worried and anxious. Heart failure can be hard on your emotions. Treatment with counseling and medicine can help. And when you feel better, you're more likely to take care of yourself. Follow-up care is a key part of your treatment and safety. Be sure to make and go to all appointments, and call your doctor if you are having problems. It's also a good idea to know your test results and keep a list of the medicines you take. Where can you learn more? Go to http://krista-jacqueline.info/. Enter D874 in the search box to learn more about \"Learning About Self-Care for Heart Failure. \"  Current as of: January 27, 2016  Content Version: 11.1  © 8522-0570 [employer]. Care instructions adapted under license by [employer] (which disclaims liability or warranty for this information). If you have questions about a medical condition or this instruction, always ask your healthcare professional. [address] any warranty or liability for your use of this information.

## 2017-03-16 NOTE — HOSPICE
This hospice RN met with pt and family at the bedside. Discussed hospice services, support provided including IDT, equipment, medications, supplies, and 24/7 on-call availability. Pt daughter, Jose Juan Manriquez is a nurse at [address] and was available at meeting. Pt and family would like to move forward with [address] admission. Notified Dr Pablo Burnett and [facility] that pt would like to be discharged today. Per Christy 27, pt will be discharging at 1330 today. Equipment ordered for tomorrow delivery including O2 concentrator (will be switched out with current O2 concentrator). Hospice will plan to admit pt upon discharge from [facility].

## 2017-03-17 NOTE — DISCHARGE SUMMARY
[address] [facility] SUMMARY    Name:  Rick Gomez  MR#:  [id number]  :  1928  Account #:  [de-identified]  Date of Adm:  2017  Date of Discharge:  2017      DATE OF DISCHARGE: 2017    PRIMARY CARE PHYSICIAN: Shoshana Herndon MD    DISPOSITION: Discharged to home with home hospice care. DISCHARGE CONDITION: Fair. DISCHARGE DIAGNOSES:  1. Acute-on-chronic systolic heart failure exacerbation, improved now. 2. History of transient ischemic attack. 3. Coronary artery disease. 4. Chronic kidney disease stage III. 5. Hypoxia. 6. Chronic respiratory failure on home oxygen. 7. Elevated liver enzymes secondary to hepatic congestion. 8. Hypothyroidism. 9. Left eye redness, possibly AN ALLERGY. 10. Hypertension, but currently his blood pressure is running lower  side. 11. DO NOT RESUSCITATE. DISCHARGE MEDICATIONS:  1. Aspirin 81 mg daily. 2. Coreg 3.125 mg b.i.d.  3. Liquifilm tears as directed. 4. Naphcon-A eyedrops to the left eye.  5. Bumex 1 mg b.i.d.  6. Plavix 75 mg daily. 7. Aspirin 81 mg daily. 8. Synthroid 50 mcg daily. 9. Lorazepam 0.5 mg at bedtime. 10. Multivitamin 1 tablet daily. 11. Simvastatin 40 mg at bedtime. Sandy 86: Hospice care was consulted. HOSPITAL COURSE: This is an 22-year-old  male who  presents to the emergency room with dyspnea on exertion. The patient  in the emergency room was noted to have acute CHF exacerbation,  was admitted to the hospital, was started on diuretics and the patient  was very clear during admission that he wanted to go to hospice, so  hospice was consulted and was admitted to the hospital. The patient  could not get too much diuretics because of his blood pressures  being on the lower side. The patient had left eye redness for which he had some anti-  allergens, after which his eye redness improved. Hospice was seen.   The patient met with the family, including 2 daughters and a friend and  the patient, they all decided they want to go to hospice. The patient  has already signed DNR form. His has been deactivated by  [employer]. I discussed with the patient and family, including his 2  daughters at the bedside in length in detail, all of them agreed with  hospice, they want to go home with hospice today. Hospice has been  arranged and set up all the equipment needed including oxygen at  home. The patient is remarried. The patient is stable for discharge  home with hospice today. Discharge instructions, followup appointments and physical exam,  please refer to the electronic medical records. The patient is alert, awake, oriented x3. I discussed with the patient  about discharge plan and followup appointments. He completely  understood and agreed with the plan. I also answered all his questions  and concerns at the bedside appropriately.         Nakia Jones MD    BT / CD  D:  03/16/2017   14:19  T:  03/17/2017   05:33  Job #:  [id number]

## 2022-05-23 NOTE — PROGRESS NOTES
Juli Bro presents today for a post-hospital follow-up of CHF. During his most recent hospitalization in December 2016 he was treated with IV bumetanide but had decreased urine output requiring the addition of milrinone. He was accompanied to the office today by his daughter who is a nurse and she states that while he was on milrinone in the hospital, he did well and felt much better. As soon as the milrinone was discontinued, his symptoms returned. Appears that prior to discharge, possible outpatient IV milrinone therapy was discussed with him and his daughter and arrangements for this are currently being investigated. He was discharged home on Bumex 1 mg twice daily and according to  Sylvester Beasley, his weight has been stable at home. He has telehealth monitoring in his home at this time and he states that his weights on their scale have been between 130 and 133 lbs. He was instructed by Dr. Jodi Maravilla to call the office if his weight increases greater than 5 pounds over a 2 day period of time. His main complaint today is of having a poor appetite. He is currently drinking 2 cans of Ensure per day to supplement his poor diet. He is an 80year old male with a history of CAD (s/p CABG in 1992). His bypasses include LIMA to LAD, lateral branch, sequential SVG to OM1 and OM 2, and sequential SVG to distal RCA and PDA. He had stent to his SVG to PDA back in 2011 when he presented with severe fatigue, exertional dyspnea, and decreased exercise capacity without chest pains. He was also noted to have significant mitral regurgitation. After his SVG to PDA stent, his mitral regurgitation improved to mild from severe previously. In retrospect, his mitral regurgitation was likely ischemic in origin, with resolution after revascularization of his SVG to RCA. He has never had chest pain even with significant coronary ischemia. His angina equivalent is severe dyspnea.  His nuclear scan performed in December of 2014 revealed decline in his ejection fraction since February of 2012 from 47% on 35%. His echocardiogram at that time showed moderate to severe mitral regurgitation. His ejection fraction by echo was 45-50%. On 3/12/15, he underwent cardiac catheterization and it demonstrated no significant progressive vein graft disease. His native coronary artery disease has progressed since 2011 but there were no lesions that can or should be revascularized at the time of the cath. However, his overall LV systolic function has worsened. On 3/26/15, he underwent AV optimization and his settings were changed from 120 msec AV delay to a 90 msec AV delay. On 8/10/16, he was preparing to  his dentures and he had an episode where he wanted to say something \"but I couldn't get the words out. \"  He stated that he had no other neurological symptoms, no weakness or facial drooping. It lasted for a few minutes and then everything was back to normal.  He then went to lunch with his significant other and they ran errands. It wasn't until later that evening that he realized what may have occurred and his daughter, who is a nurse, found out about what happened. She called and told him to go the ER which he did the next day. He went to [facility] and from there, he was sent to [facility] and admitted for testing. The hospitalist who saw him at [facility], Dr. Jermaine Jimenez, scheduled him to see a vascular surgeon, Dr. Abdelrahman Menendez on 9/9/16. A CTA of the head and neck showed extensive heterogeneous plaque involving the common carotid arteries and internal carotid arteries. Stenosis approaches high-grade on the right at 64% with mild to moderate stenosis on the left, 40% by NASCET criteria. However, Mr. Shahla Ngo does not want to see this surgeon. He requested an appointment with Dr. Kiara De La Cruz to get his opinion on what he should do. He saw Dr. Kiara De La Cruz on 8/16/16 and recommended neurology follow-up with Dr. Joelle Orozco.   Further recommendations to follow depending on neurology's recommendations (please see Dr. Rory Acosta most recent office note). On 10/13/16, he underwent cardiac catheterization after a nuclear scan showed inferior and inferolateral perfusion defect and he had worsening CHF and mitral regurgitation which was his previous anginal equivalent. The cath demonstrated the followin. Severe native CAD with occluded LAD, circumflex, and mid right coronary   artery. 2. LIMA to LAD is widely patent. 3. SVG to OM1 and OM2 is widely patent. 4. SVG to RPDA and PL branch has 80% lesion at the site of the insertion to   the RPDA. This is likely his culprit lesion. He underwent balloon angioplasty and stenting with Xience JOSE (18 mm length) to the SVG to RPDA, PL branch to residual 0%. He states that he is feeling [place]. \" He denies chest pain, tightness, heaviness, and palpitations. He denies shortness of breath at rest, denies dyspnea on exertion, he admits to 2-3 pillow orthopnea and denies PND. He denies abdominal bloating. He denies lightheadedness, dizziness, and syncope. He admits to only mild lower extremity edema that has markedly improved and denies claudication. Denies nausea, vomiting, diarrhea, fever, chills. PMH:  Past Medical History   Diagnosis Date    Aortic insufficiency      stable    Bilateral renal artery stenosis (HCC)     CAD (coronary artery disease)      Appears stable. Status post CABG in , with LIMA to LAD and diagonal branch, sequential SVG to OM1 and OM2, and sequential SVG to distal PCA and PDA.  Cancer [facility])      Prostate, now in remission.  Cardiac catheterization 10/13/2016     Severe native CAD. LAD, Cx, & mRCA 100%. LIMA to LAD patent. SVG to OM1 & OM2 patent. SVG to RPDA and PLB 80% at insert site to RPDA (3 x 18-mm Xience JOSE, resid 0%).  Cardiac echocardiogram abnormal 10/05/2016     Mod LVE. EF 35% (prev 40% on 11/5/15).   Severe basal-mid inferior & basal-mid inferolateral hypk. Mild LVH. Gr 3 DDfx. Mild RVE. RVSP 65 mmHg. Severe LAE. Mild ESTELA. Severe MR. Mod AI. Mod-severe TR.  IVCE. AI, MR, PAP have worsened since study of 11/5/15.  Cardiac nuclear imaging test, high risk 10/05/2016     High risk. Lg fixed basal inferior, mid inferior & inferolateral defect related to infarction w/o anirudh-infarct ischemia in RCA. Sm, fixed high anterolateral defect, likely infarction w/o ischemia. Massive LVE. Mod inferior, inferolateral hypk. Marked hypk of whole apical complex. EF 27% (prev 35% in study of 12/2014).  Cardiovascular LE venous duplex 11/02/2016     Right leg:  No DVT or venous reflux. Left leg:  Deep venous reflux of mid fem, popliteal AK and BK. Reflux time 2.0 secs.  Cardiovascular renal duplex 05/06/2015     No significant RA stenosis.  Carotid duplex 08/19/2016     Mod 50-69% bilateral ICA stenosis. Probable significant bilateral ECA stenosis. Less than 50% stenosis of both subclavians. Mod calcified plaque in bilateral CCA & bilateral ICA & bulb. No signficant chg from study of 9/10/15.  Dyslipidemia      on Zocor.  Gastroenteritis 7/2010     Hospitalized and given IV fluids and released.  Hearing loss in right ear     Hydronephrosis      bilateral    Impotence of organic origin     Ischemic cardiomyopathy      Severe, at least symptomatically, appears to be doing better.  Malignant neoplasm of prostate ([address]     Mitral regurgitation      at one time, he has severe mitral regurgitation. However, after his SVG to PDA stent, his mitral regurgitation improved only mild. His last echocardiogram of 2012 showed mild MR.  Status post insertion of drug eluting coronary artery stent 1/2011     SVG to PDA seq (PLB) stented with 2.75mm Cypher    Stroke [facility]) 08/10/2016       PSH:  Past Surgical History   Procedure Laterality Date    Hx heart catheterization  1/2011     Severe native three vessel disease.   LIMA to LAD; SVG to OM1 and OM2; SVG to PDA and PLB with JOSE stent.  Hx other surgical       AICD implantation    Hx hernia repair   and      Hiatal hernia repair    Hx cholecystectomy      Hx pacemaker  10/23/09     Upgrade to Guidant single-chamber ICD to [address] biventricular pacemaker ICD with atrial lead insertion     Pr cabg, arterial, four+       LIMA to LAD; SVG to OM1 and OM2; SVG to PDA and PLB. MEDS:  Current Outpatient Prescriptions   Medication Sig    LORazepam (ATIVAN) 0.5 mg tablet Take 1 Tab by mouth every eight (8) hours as needed. Max Daily Amount: 1.5 mg. (Patient taking differently: Take 1 Tab by mouth every eight (8) hours as needed (as needed for anxiety). )    bacitracin (BACITRACIN) ointment Apply 500 Units to affected area two (2) times a day.  bumetanide (BUMEX) 1 mg tablet Take 1 Tab by mouth two (2) times a day.  spironolactone (ALDACTONE) 25 mg tablet Take 1 Tab by mouth daily.  lisinopril (PRINIVIL, ZESTRIL) 2.5 mg tablet Take 1 Tab by mouth daily.  potassium chloride (K-DUR, KLOR-CON) 20 mEq tablet Take 1 Tab by mouth daily. OR AS DIRECTED    simvastatin (ZOCOR) 40 mg tablet Take 1 Tab by mouth nightly.  digoxin (LANOXIN) 0.125 mg tablet TAKE 1 TABLET EVERY DAY    carvedilol (COREG) 25 mg tablet TAKE 1 TABLET TWICE DAILY    clopidogrel (PLAVIX) 75 mg tablet TAKE 1 TABLET EVERY DAY    multivitamin (ONE A DAY) tablet Take 1 Tab by mouth daily.  aspirin 81 mg tablet Take 81 mg by mouth daily.  Walker misc Wheeled walker    levothyroxine (SYNTHROID) 50 mcg tablet Take 1 Tab by mouth Daily (before breakfast). No current facility-administered medications for this visit.           Allergies and Sensitivities:  No Known Allergies    Family History:  Family History   Problem Relation Age of Onset    Heart Disease Mother     Heart Failure Father     Other Brother 76     heart transplant, later        Social History:  He reports that he has never smoked. He has never used smokeless tobacco.  He  reports that he does not drink alcohol. Physical:  Visit Vitals    /50    Pulse 70    Ht 5' 7\" (1.702 m)    Wt 60.8 kg (134 lb)    SpO2 97%    BMI 20.99 kg/m2         His weight is down 15 lbs since his last appointment. Exam:  Neck:  Supple, no JVD, soft right carotid bruit  CV:  Normal S1 and  S2, grade II/VI JEVON noted, no rubs, or gallops noted  Lungs:  Clear to ausculation throughout, no wheezes or rales  Abd:  Soft, non-tender, non-distended with good bowel sounds. No hepatosplenomegaly  Extremities:  trace lower extremity edema      Data:  EKG:   Ventricular paced rhythm, rate 70. LABS:  Lab Results   Component Value Date/Time    Sodium 137 12/26/2016 03:24 AM    Potassium 4.1 12/26/2016 03:24 AM    Chloride 100 12/26/2016 03:24 AM    CO2 26 12/26/2016 03:24 AM    Glucose 104 12/26/2016 03:24 AM    BUN 40 12/26/2016 03:24 AM    Creatinine 1.42 12/26/2016 03:24 AM     No results found for: CHOL  Lab Results   Component Value Date/Time    ALT 41 12/19/2016 05:45 PM       Impression / Plan:  1.  CAD, s/p CABG in 1992, stable, s/p recent PCI/stent to the SVG to RPDA sequential to RPL  2. Ischemic cardiomyopathy, s/p Bi-V/ICD (upgrade in 2009), s/p AV optimization on 3/26/15  3. Dyslipidemia, on simvastatin 20mg  4. Mitral and aortic valve regurgitation  5. TIA, manifested as a short period of expressive aphasia  6. Carotid stenosis, high-grade on the right at 64% and mild to moderate on the left  7. Chronic systolic heart failure, appears compensated  8. Hypothyroidism, Synthroid recently increased to 50mcg per day (during hospitalization)    Mr. Artis Alcantar was seen today for follow-up after recent hospitalization in mid December 2016 for acute on chronic systolic heart failure.   He was treated with IV Bumex with improvement in symptoms however he began to have decreased urine output which required the addition of milrinone therapy. According to his daughter, who is a nurse, he felt well and did well while on IV milrinone. As soon as the milrinone was discontinued, she states that some of his symptoms returned. According to Dr. Bro Chamberlain last progress note in the hospital, he is considering doing IV milrinone therapy every couple of weeks. We checked with home health and they do not do IV milrinone. Other options are being investigated. He was discharged home on Bumex 1 mg twice a day with instructions to call the office if his weight increases greater than 5 pounds in about 2 days. He complains of an intermittent dry cough. He is currently taking lisinopril 2.5 mg daily but has been on an ACE inhibitor for some time. Is instructed to hold the lisinopril for 1 week. If his cough does not resolve while he is off of the medication, he is to resume taking the lisinopril as prescribed. His daughter states that she will call us to let us know. If the cough does not go away, may need to discontinue the ACE inhibitor and start him on a low dose ARB. His blood pressure and heart rate are well controlled. His breath sounds are clear and he has trace lower extremity edema. He denies abdominal bloating and overall feels better than he did prior to being admitted. He will have a BMP and TSH done today (lab slip given) to follow-up on his renal function and electrolytes and hypothyroidism. His daughter states that Dr. Ophelia Oneal increased his Synthroid to 50 µg per day during Mr. Barahona hospitalization. Congestive heart failure teaching reinforced today. Advised to limit sodium intake to no more than 2000mg per day and to also limit his fluid intake. Advised to weigh daily every morning and record weights.   Instructed to call our office if progressive weight gain is noted over a 2 to 3 day period of time, shortness of breath increases, or if abdominal bloating, nausea, fatigue, or increased lower extremity edema is noted. He will follow-up with Dr. Susan Jacob as scheduled and PRN. He knows to call if he has any cardiac problems or concerns prior to his next scheduled appointment. He will follow-up in the device clinic as scheduled and PRN. Lower extremity venous duplex study was completed on 11/2/16 and it showed no DVTs but venous insufficiency was noted on the left.         Rosalba Arzola MSN, FNP-BC 4 = No assist / stand by assistance